# Patient Record
Sex: FEMALE | Race: WHITE | NOT HISPANIC OR LATINO | Employment: OTHER | URBAN - METROPOLITAN AREA
[De-identification: names, ages, dates, MRNs, and addresses within clinical notes are randomized per-mention and may not be internally consistent; named-entity substitution may affect disease eponyms.]

---

## 2021-06-09 ENCOUNTER — HOSPITAL ENCOUNTER (EMERGENCY)
Facility: HOSPITAL | Age: 76
Discharge: HOME | End: 2021-06-09
Attending: EMERGENCY MEDICINE
Payer: MEDICARE

## 2021-06-09 VITALS
HEIGHT: 57 IN | SYSTOLIC BLOOD PRESSURE: 196 MMHG | RESPIRATION RATE: 16 BRPM | DIASTOLIC BLOOD PRESSURE: 87 MMHG | OXYGEN SATURATION: 99 % | WEIGHT: 117.75 LBS | BODY MASS INDEX: 25.4 KG/M2 | TEMPERATURE: 99 F | HEART RATE: 55 BPM

## 2021-06-09 DIAGNOSIS — R63.0 DECREASED APPETITE: Primary | ICD-10-CM

## 2021-06-09 DIAGNOSIS — R53.1 ASTHENIA: ICD-10-CM

## 2021-06-09 LAB
ALBUMIN SERPL BCP-MCNC: 3.7 G/DL (ref 3.5–5.2)
ALP SERPL-CCNC: 121 U/L (ref 55–135)
ALT SERPL W/O P-5'-P-CCNC: 67 U/L (ref 10–44)
ANION GAP SERPL CALC-SCNC: 12 MMOL/L (ref 8–16)
AST SERPL-CCNC: 62 U/L (ref 10–40)
BACTERIA #/AREA URNS HPF: NORMAL /HPF
BASOPHILS # BLD AUTO: 0.1 K/UL (ref 0–0.2)
BASOPHILS NFR BLD: 1.1 % (ref 0–1.9)
BILIRUB SERPL-MCNC: 0.8 MG/DL (ref 0.1–1)
BILIRUB UR QL STRIP: NEGATIVE
BNP SERPL-MCNC: 87 PG/ML (ref 0–99)
BUN SERPL-MCNC: 19 MG/DL (ref 8–23)
CALCIUM SERPL-MCNC: 9 MG/DL (ref 8.7–10.5)
CHLORIDE SERPL-SCNC: 105 MMOL/L (ref 95–110)
CLARITY UR: CLEAR
CO2 SERPL-SCNC: 24 MMOL/L (ref 23–29)
COLOR UR: YELLOW
CREAT SERPL-MCNC: 1.3 MG/DL (ref 0.5–1.4)
DIFFERENTIAL METHOD: ABNORMAL
EOSINOPHIL # BLD AUTO: 0.1 K/UL (ref 0–0.5)
EOSINOPHIL NFR BLD: 1.3 % (ref 0–8)
ERYTHROCYTE [DISTWIDTH] IN BLOOD BY AUTOMATED COUNT: 15.9 % (ref 11.5–14.5)
EST. GFR  (AFRICAN AMERICAN): 46 ML/MIN/1.73 M^2
EST. GFR  (NON AFRICAN AMERICAN): 40 ML/MIN/1.73 M^2
GLUCOSE SERPL-MCNC: 109 MG/DL (ref 70–110)
GLUCOSE UR QL STRIP: NEGATIVE
HCT VFR BLD AUTO: 38.5 % (ref 37–48.5)
HGB BLD-MCNC: 12.2 G/DL (ref 12–16)
HGB UR QL STRIP: NEGATIVE
HYALINE CASTS #/AREA URNS LPF: 0 /LPF
IMM GRANULOCYTES # BLD AUTO: 0.02 K/UL (ref 0–0.04)
IMM GRANULOCYTES NFR BLD AUTO: 0.2 % (ref 0–0.5)
KETONES UR QL STRIP: NEGATIVE
LEUKOCYTE ESTERASE UR QL STRIP: NEGATIVE
LYMPHOCYTES # BLD AUTO: 2.1 K/UL (ref 1–4.8)
LYMPHOCYTES NFR BLD: 23.7 % (ref 18–48)
MCH RBC QN AUTO: 29.9 PG (ref 27–31)
MCHC RBC AUTO-ENTMCNC: 31.7 G/DL (ref 32–36)
MCV RBC AUTO: 94 FL (ref 82–98)
MICROSCOPIC COMMENT: NORMAL
MONOCYTES # BLD AUTO: 0.9 K/UL (ref 0.3–1)
MONOCYTES NFR BLD: 10.4 % (ref 4–15)
NEUTROPHILS # BLD AUTO: 5.7 K/UL (ref 1.8–7.7)
NEUTROPHILS NFR BLD: 63.3 % (ref 38–73)
NITRITE UR QL STRIP: NEGATIVE
NRBC BLD-RTO: 0 /100 WBC
PH UR STRIP: 7 [PH] (ref 5–8)
PLATELET # BLD AUTO: 283 K/UL (ref 150–450)
PMV BLD AUTO: 10.4 FL (ref 9.2–12.9)
POTASSIUM SERPL-SCNC: 3.5 MMOL/L (ref 3.5–5.1)
PROT SERPL-MCNC: 6.8 G/DL (ref 6–8.4)
PROT UR QL STRIP: ABNORMAL
RBC # BLD AUTO: 4.08 M/UL (ref 4–5.4)
RBC #/AREA URNS HPF: 0 /HPF (ref 0–4)
SODIUM SERPL-SCNC: 141 MMOL/L (ref 136–145)
SP GR UR STRIP: 1.02 (ref 1–1.03)
TROPONIN I SERPL DL<=0.01 NG/ML-MCNC: 0.02 NG/ML (ref 0–0.03)
URN SPEC COLLECT METH UR: ABNORMAL
UROBILINOGEN UR STRIP-ACNC: NEGATIVE EU/DL
WBC # BLD AUTO: 8.95 K/UL (ref 3.9–12.7)
WBC #/AREA URNS HPF: 1 /HPF (ref 0–5)

## 2021-06-09 PROCEDURE — 93010 ELECTROCARDIOGRAM REPORT: CPT | Mod: ,,, | Performed by: INTERNAL MEDICINE

## 2021-06-09 PROCEDURE — 80053 COMPREHEN METABOLIC PANEL: CPT | Performed by: EMERGENCY MEDICINE

## 2021-06-09 PROCEDURE — 84484 ASSAY OF TROPONIN QUANT: CPT | Performed by: EMERGENCY MEDICINE

## 2021-06-09 PROCEDURE — 83880 ASSAY OF NATRIURETIC PEPTIDE: CPT | Performed by: EMERGENCY MEDICINE

## 2021-06-09 PROCEDURE — 96360 HYDRATION IV INFUSION INIT: CPT

## 2021-06-09 PROCEDURE — 93005 ELECTROCARDIOGRAM TRACING: CPT

## 2021-06-09 PROCEDURE — 25000003 PHARM REV CODE 250: Performed by: EMERGENCY MEDICINE

## 2021-06-09 PROCEDURE — 85025 COMPLETE CBC W/AUTO DIFF WBC: CPT | Performed by: EMERGENCY MEDICINE

## 2021-06-09 PROCEDURE — 99285 EMERGENCY DEPT VISIT HI MDM: CPT | Mod: 25

## 2021-06-09 PROCEDURE — 81000 URINALYSIS NONAUTO W/SCOPE: CPT | Performed by: EMERGENCY MEDICINE

## 2021-06-09 PROCEDURE — 96361 HYDRATE IV INFUSION ADD-ON: CPT

## 2021-06-09 PROCEDURE — 93010 EKG 12-LEAD: ICD-10-PCS | Mod: ,,, | Performed by: INTERNAL MEDICINE

## 2021-06-09 RX ORDER — OMEPRAZOLE 40 MG/1
40 CAPSULE, DELAYED RELEASE ORAL DAILY
COMMUNITY
Start: 2021-05-23

## 2021-06-09 RX ORDER — ACETAMINOPHEN 500 MG
1000 TABLET ORAL
Status: COMPLETED | OUTPATIENT
Start: 2021-06-09 | End: 2021-06-09

## 2021-06-09 RX ORDER — TOPIRAMATE 25 MG/1
25 TABLET ORAL DAILY
COMMUNITY
Start: 2021-05-04

## 2021-06-09 RX ORDER — ONDANSETRON HYDROCHLORIDE 8 MG/1
TABLET, FILM COATED ORAL
COMMUNITY
Start: 2021-06-09

## 2021-06-09 RX ORDER — IBUPROFEN 600 MG/1
600 TABLET ORAL 3 TIMES DAILY
COMMUNITY
Start: 2021-05-05

## 2021-06-09 RX ORDER — NALOXEGOL OXALATE 25 MG/1
25 TABLET, FILM COATED ORAL EVERY MORNING
COMMUNITY
Start: 2021-02-23

## 2021-06-09 RX ORDER — CLOPIDOGREL BISULFATE 75 MG/1
75 TABLET ORAL DAILY
COMMUNITY
Start: 2021-03-22

## 2021-06-09 RX ORDER — FUROSEMIDE 20 MG/1
20 TABLET ORAL DAILY
COMMUNITY
Start: 2021-03-02

## 2021-06-09 RX ORDER — AMLODIPINE BESYLATE 10 MG/1
5 TABLET ORAL DAILY
COMMUNITY
Start: 2020-12-14

## 2021-06-09 RX ORDER — ROSUVASTATIN CALCIUM 40 MG/1
TABLET, COATED ORAL
COMMUNITY
Start: 2021-03-18

## 2021-06-09 RX ORDER — SPIRONOLACTONE 50 MG/1
50 TABLET, FILM COATED ORAL DAILY
COMMUNITY
Start: 2021-02-08

## 2021-06-09 RX ORDER — BUSPIRONE HYDROCHLORIDE 10 MG/1
10 TABLET ORAL 2 TIMES DAILY
COMMUNITY
Start: 2021-02-26

## 2021-06-09 RX ORDER — TRAZODONE HYDROCHLORIDE 50 MG/1
50 TABLET ORAL NIGHTLY PRN
COMMUNITY
Start: 2021-01-02

## 2021-06-09 RX ORDER — METOPROLOL SUCCINATE 25 MG/1
25 TABLET, EXTENDED RELEASE ORAL 2 TIMES DAILY
COMMUNITY
Start: 2021-02-26

## 2021-06-09 RX ORDER — AMLODIPINE BESYLATE 5 MG/1
10 TABLET ORAL
Status: COMPLETED | OUTPATIENT
Start: 2021-06-09 | End: 2021-06-09

## 2021-06-09 RX ORDER — LACTULOSE 10 G/15ML
SOLUTION ORAL; RECTAL
COMMUNITY
Start: 2021-06-07

## 2021-06-09 RX ORDER — GABAPENTIN 300 MG/1
300 CAPSULE ORAL 3 TIMES DAILY
COMMUNITY
Start: 2021-04-30

## 2021-06-09 RX ORDER — SODIUM CHLORIDE 9 MG/ML
INJECTION, SOLUTION INTRAVENOUS
Status: COMPLETED | OUTPATIENT
Start: 2021-06-09 | End: 2021-06-09

## 2021-06-09 RX ORDER — ISOSORBIDE MONONITRATE 30 MG/1
TABLET, EXTENDED RELEASE ORAL
COMMUNITY
Start: 2021-05-04

## 2021-06-09 RX ORDER — TIZANIDINE 4 MG/1
4 TABLET ORAL 2 TIMES DAILY PRN
COMMUNITY
Start: 2021-05-05

## 2021-06-09 RX ORDER — DULOXETIN HYDROCHLORIDE 20 MG/1
20 CAPSULE, DELAYED RELEASE ORAL DAILY
COMMUNITY
Start: 2021-06-04

## 2021-06-09 RX ORDER — OXYCODONE HYDROCHLORIDE 15 MG/1
15 TABLET ORAL 2 TIMES DAILY
COMMUNITY
Start: 2021-05-05

## 2021-06-09 RX ORDER — HYDROXYZINE HYDROCHLORIDE 10 MG/1
10 TABLET, FILM COATED ORAL 2 TIMES DAILY PRN
COMMUNITY
Start: 2021-02-26

## 2021-06-09 RX ORDER — MORPHINE SULFATE 15 MG/1
TABLET ORAL
COMMUNITY

## 2021-06-09 RX ADMIN — ACETAMINOPHEN 1000 MG: 500 TABLET ORAL at 04:06

## 2021-06-09 RX ADMIN — SODIUM CHLORIDE: 0.9 INJECTION, SOLUTION INTRAVENOUS at 03:06

## 2021-06-09 RX ADMIN — AMLODIPINE BESYLATE 10 MG: 5 TABLET ORAL at 06:06

## 2022-09-09 ENCOUNTER — HOSPITAL ENCOUNTER (OUTPATIENT)
Facility: HOSPITAL | Age: 77
Discharge: HOME HEALTH | End: 2022-09-10
Attending: EMERGENCY MEDICINE | Admitting: STUDENT IN AN ORGANIZED HEALTH CARE EDUCATION/TRAINING PROGRAM
Payer: MEDICARE

## 2022-09-09 DIAGNOSIS — S32.009A CLOSED FRACTURE OF TRANSVERSE PROCESS OF LUMBAR VERTEBRA, INITIAL ENCOUNTER: Primary | ICD-10-CM

## 2022-09-09 DIAGNOSIS — G25.3 MYOCLONIC JERKING: ICD-10-CM

## 2022-09-09 DIAGNOSIS — M25.551 RIGHT HIP PAIN: ICD-10-CM
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
## 2022-09-09 DIAGNOSIS — N17.9 AKI (ACUTE KIDNEY INJURY): ICD-10-CM

## 2022-09-09 DIAGNOSIS — S01.512A LACERATION OF TONGUE, INITIAL ENCOUNTER: ICD-10-CM

## 2022-09-09 DIAGNOSIS — M54.50 ACUTE MIDLINE LOW BACK PAIN WITHOUT SCIATICA: ICD-10-CM

## 2022-09-09 PROBLEM — I10 HTN (HYPERTENSION): Status: ACTIVE | Noted: 2022-09-09

## 2022-09-09 PROBLEM — G93.40 ENCEPHALOPATHY ACUTE: Status: ACTIVE | Noted: 2022-09-09

## 2022-09-09 PROBLEM — F41.1 GAD (GENERALIZED ANXIETY DISORDER): Status: ACTIVE | Noted: 2022-09-09

## 2022-09-09 PROBLEM — Z86.73 HISTORY OF STROKE: Status: ACTIVE | Noted: 2022-09-09

## 2022-09-09 PROBLEM — G93.41 ACUTE METABOLIC ENCEPHALOPATHY: Status: ACTIVE | Noted: 2022-09-09

## 2022-09-09 LAB
ALBUMIN SERPL BCP-MCNC: 3.4 G/DL (ref 3.5–5.2)
ALP SERPL-CCNC: 168 U/L (ref 55–135)
ALT SERPL W/O P-5'-P-CCNC: 22 U/L (ref 10–44)
AMPHET+METHAMPHET UR QL: NEGATIVE
ANION GAP SERPL CALC-SCNC: 9 MMOL/L (ref 8–16)
AST SERPL-CCNC: 28 U/L (ref 10–40)
BACTERIA #/AREA URNS HPF: ABNORMAL /HPF
BARBITURATES UR QL SCN>200 NG/ML: NEGATIVE
BASOPHILS # BLD AUTO: 0.06 K/UL (ref 0–0.2)
BASOPHILS NFR BLD: 0.7 % (ref 0–1.9)
BENZODIAZ UR QL SCN>200 NG/ML: NEGATIVE
BILIRUB SERPL-MCNC: 0.6 MG/DL (ref 0.1–1)
BILIRUB UR QL STRIP: NEGATIVE
BNP SERPL-MCNC: 265 PG/ML (ref 0–99)
BUN SERPL-MCNC: 22 MG/DL (ref 8–23)
BZE UR QL SCN: NEGATIVE
CALCIUM SERPL-MCNC: 8.9 MG/DL (ref 8.7–10.5)
CANNABINOIDS UR QL SCN: NEGATIVE
CHLORIDE SERPL-SCNC: 106 MMOL/L (ref 95–110)
CLARITY UR: ABNORMAL
CO2 SERPL-SCNC: 21 MMOL/L (ref 23–29)
COLOR UR: YELLOW
CREAT SERPL-MCNC: 1.9 MG/DL (ref 0.5–1.4)
CREAT UR-MCNC: 63.6 MG/DL (ref 15–325)
DIFFERENTIAL METHOD: ABNORMAL
EOSINOPHIL # BLD AUTO: 0.3 K/UL (ref 0–0.5)
EOSINOPHIL NFR BLD: 3.5 % (ref 0–8)
ERYTHROCYTE [DISTWIDTH] IN BLOOD BY AUTOMATED COUNT: 18.1 % (ref 11.5–14.5)
EST. GFR  (NO RACE VARIABLE): 27 ML/MIN/1.73 M^2
GLUCOSE SERPL-MCNC: 137 MG/DL (ref 70–110)
GLUCOSE UR QL STRIP: NEGATIVE
HCT VFR BLD AUTO: 32.1 % (ref 37–48.5)
HGB BLD-MCNC: 10.5 G/DL (ref 12–16)
HGB UR QL STRIP: NEGATIVE
HYALINE CASTS #/AREA URNS LPF: 0 /LPF
IMM GRANULOCYTES # BLD AUTO: 0.03 K/UL (ref 0–0.04)
IMM GRANULOCYTES NFR BLD AUTO: 0.4 % (ref 0–0.5)
INR PPP: 1 (ref 0.8–1.2)
KETONES UR QL STRIP: NEGATIVE
LEUKOCYTE ESTERASE UR QL STRIP: ABNORMAL
LYMPHOCYTES # BLD AUTO: 2.6 K/UL (ref 1–4.8)
LYMPHOCYTES NFR BLD: 32.2 % (ref 18–48)
MCH RBC QN AUTO: 30.7 PG (ref 27–31)
MCHC RBC AUTO-ENTMCNC: 32.7 G/DL (ref 32–36)
MCV RBC AUTO: 94 FL (ref 82–98)
METHADONE UR QL SCN>300 NG/ML: NEGATIVE
MICROSCOPIC COMMENT: ABNORMAL
MONOCYTES # BLD AUTO: 0.9 K/UL (ref 0.3–1)
MONOCYTES NFR BLD: 11.1 % (ref 4–15)
NEUTROPHILS # BLD AUTO: 4.3 K/UL (ref 1.8–7.7)
NEUTROPHILS NFR BLD: 52.1 % (ref 38–73)
NITRITE UR QL STRIP: NEGATIVE
NRBC BLD-RTO: 0 /100 WBC
OPIATES UR QL SCN: ABNORMAL
PCP UR QL SCN>25 NG/ML: NEGATIVE
PH UR STRIP: 6 [PH] (ref 5–8)
PLATELET # BLD AUTO: 333 K/UL (ref 150–450)
PMV BLD AUTO: 10.4 FL (ref 9.2–12.9)
POCT GLUCOSE: 150 MG/DL (ref 70–110)
POTASSIUM SERPL-SCNC: 4.3 MMOL/L (ref 3.5–5.1)
PROT SERPL-MCNC: 7.2 G/DL (ref 6–8.4)
PROT UR QL STRIP: ABNORMAL
PROTHROMBIN TIME: 11 SEC (ref 9–12.5)
RBC # BLD AUTO: 3.42 M/UL (ref 4–5.4)
RBC #/AREA URNS HPF: 0 /HPF (ref 0–4)
SARS-COV-2 RDRP RESP QL NAA+PROBE: NEGATIVE
SODIUM SERPL-SCNC: 136 MMOL/L (ref 136–145)
SP GR UR STRIP: 1.01 (ref 1–1.03)
SQUAMOUS #/AREA URNS HPF: 2 /HPF
TOXICOLOGY INFORMATION: ABNORMAL
TROPONIN I SERPL DL<=0.01 NG/ML-MCNC: 0.03 NG/ML (ref 0–0.03)
TSH SERPL DL<=0.005 MIU/L-ACNC: 2.68 UIU/ML (ref 0.4–4)
URN SPEC COLLECT METH UR: ABNORMAL
UROBILINOGEN UR STRIP-ACNC: NEGATIVE EU/DL
WBC # BLD AUTO: 8.2 K/UL (ref 3.9–12.7)
WBC #/AREA URNS HPF: 3 /HPF (ref 0–5)

## 2022-09-09 PROCEDURE — 84443 ASSAY THYROID STIM HORMONE: CPT | Performed by: EMERGENCY MEDICINE

## 2022-09-09 PROCEDURE — 96375 TX/PRO/DX INJ NEW DRUG ADDON: CPT

## 2022-09-09 PROCEDURE — G0378 HOSPITAL OBSERVATION PER HR: HCPCS

## 2022-09-09 PROCEDURE — U0002 COVID-19 LAB TEST NON-CDC: HCPCS | Performed by: EMERGENCY MEDICINE

## 2022-09-09 PROCEDURE — 85610 PROTHROMBIN TIME: CPT | Performed by: EMERGENCY MEDICINE

## 2022-09-09 PROCEDURE — 93010 ELECTROCARDIOGRAM REPORT: CPT | Mod: ,,, | Performed by: INTERNAL MEDICINE

## 2022-09-09 PROCEDURE — 84484 ASSAY OF TROPONIN QUANT: CPT | Performed by: EMERGENCY MEDICINE

## 2022-09-09 PROCEDURE — 82962 GLUCOSE BLOOD TEST: CPT

## 2022-09-09 PROCEDURE — G0425 PR INPT TELEHEALTH CONSULT 30M: ICD-10-PCS | Mod: 95,,, | Performed by: PSYCHIATRY & NEUROLOGY

## 2022-09-09 PROCEDURE — 93005 ELECTROCARDIOGRAM TRACING: CPT

## 2022-09-09 PROCEDURE — 96361 HYDRATE IV INFUSION ADD-ON: CPT

## 2022-09-09 PROCEDURE — 81000 URINALYSIS NONAUTO W/SCOPE: CPT | Mod: 59 | Performed by: EMERGENCY MEDICINE

## 2022-09-09 PROCEDURE — 63600175 PHARM REV CODE 636 W HCPCS: Performed by: EMERGENCY MEDICINE

## 2022-09-09 PROCEDURE — 63600175 PHARM REV CODE 636 W HCPCS: Performed by: INTERNAL MEDICINE

## 2022-09-09 PROCEDURE — G0425 INPT/ED TELECONSULT30: HCPCS | Mod: 95,,, | Performed by: PSYCHIATRY & NEUROLOGY

## 2022-09-09 PROCEDURE — 99291 CRITICAL CARE FIRST HOUR: CPT | Mod: 25,CS

## 2022-09-09 PROCEDURE — 25000003 PHARM REV CODE 250: Performed by: EMERGENCY MEDICINE

## 2022-09-09 PROCEDURE — 83880 ASSAY OF NATRIURETIC PEPTIDE: CPT | Performed by: EMERGENCY MEDICINE

## 2022-09-09 PROCEDURE — 80061 LIPID PANEL: CPT | Performed by: EMERGENCY MEDICINE

## 2022-09-09 PROCEDURE — 85025 COMPLETE CBC W/AUTO DIFF WBC: CPT | Performed by: EMERGENCY MEDICINE

## 2022-09-09 PROCEDURE — 80307 DRUG TEST PRSMV CHEM ANLYZR: CPT | Performed by: EMERGENCY MEDICINE

## 2022-09-09 PROCEDURE — 93010 EKG 12-LEAD: ICD-10-PCS | Mod: ,,, | Performed by: INTERNAL MEDICINE

## 2022-09-09 PROCEDURE — 80053 COMPREHEN METABOLIC PANEL: CPT | Performed by: EMERGENCY MEDICINE

## 2022-09-09 RX ORDER — GUAIFENESIN 100 MG/5ML
200 SOLUTION ORAL EVERY 4 HOURS PRN
Status: DISCONTINUED | OUTPATIENT
Start: 2022-09-10 | End: 2022-09-10 | Stop reason: HOSPADM

## 2022-09-09 RX ORDER — NITROGLYCERIN 0.4 MG/1
0.4 TABLET SUBLINGUAL EVERY 5 MIN PRN
COMMUNITY

## 2022-09-09 RX ORDER — OLMESARTAN MEDOXOMIL 40 MG/1
40 TABLET ORAL DAILY
COMMUNITY
Start: 2022-07-14

## 2022-09-09 RX ORDER — ENOXAPARIN SODIUM 100 MG/ML
30 INJECTION SUBCUTANEOUS EVERY 24 HOURS
Status: DISCONTINUED | OUTPATIENT
Start: 2022-09-10 | End: 2022-09-10 | Stop reason: HOSPADM

## 2022-09-09 RX ORDER — SODIUM CHLORIDE 9 MG/ML
INJECTION, SOLUTION INTRAVENOUS CONTINUOUS
Status: DISCONTINUED | OUTPATIENT
Start: 2022-09-10 | End: 2022-09-10 | Stop reason: HOSPADM

## 2022-09-09 RX ORDER — OLMESARTAN MEDOXOMIL 20 MG/1
20 TABLET ORAL
Status: ON HOLD | COMMUNITY
End: 2022-09-10 | Stop reason: HOSPADM

## 2022-09-09 RX ORDER — MAG HYDROX/ALUMINUM HYD/SIMETH 200-200-20
30 SUSPENSION, ORAL (FINAL DOSE FORM) ORAL EVERY 6 HOURS PRN
Status: DISCONTINUED | OUTPATIENT
Start: 2022-09-10 | End: 2022-09-10 | Stop reason: HOSPADM

## 2022-09-09 RX ORDER — IPRATROPIUM BROMIDE AND ALBUTEROL SULFATE 2.5; .5 MG/3ML; MG/3ML
3 SOLUTION RESPIRATORY (INHALATION) EVERY 4 HOURS PRN
Status: DISCONTINUED | OUTPATIENT
Start: 2022-09-10 | End: 2022-09-10 | Stop reason: HOSPADM

## 2022-09-09 RX ORDER — ONDANSETRON 2 MG/ML
4 INJECTION INTRAMUSCULAR; INTRAVENOUS EVERY 8 HOURS PRN
Status: DISCONTINUED | OUTPATIENT
Start: 2022-09-10 | End: 2022-09-10 | Stop reason: HOSPADM

## 2022-09-09 RX ORDER — HYDRALAZINE HYDROCHLORIDE 20 MG/ML
10 INJECTION INTRAMUSCULAR; INTRAVENOUS EVERY 6 HOURS PRN
Status: DISCONTINUED | OUTPATIENT
Start: 2022-09-09 | End: 2022-09-10 | Stop reason: HOSPADM

## 2022-09-09 RX ORDER — ACETAMINOPHEN 325 MG/1
650 TABLET ORAL EVERY 6 HOURS PRN
Status: DISCONTINUED | OUTPATIENT
Start: 2022-09-10 | End: 2022-09-10 | Stop reason: HOSPADM

## 2022-09-09 RX ORDER — LORAZEPAM 2 MG/ML
0.5 INJECTION INTRAMUSCULAR
Status: COMPLETED | OUTPATIENT
Start: 2022-09-09 | End: 2022-09-09

## 2022-09-09 RX ADMIN — HYDRALAZINE HYDROCHLORIDE 10 MG: 20 INJECTION INTRAMUSCULAR; INTRAVENOUS at 10:09

## 2022-09-09 RX ADMIN — LORAZEPAM 0.5 MG: 2 INJECTION INTRAMUSCULAR; INTRAVENOUS at 04:09

## 2022-09-09 RX ADMIN — SODIUM CHLORIDE, SODIUM LACTATE, POTASSIUM CHLORIDE, AND CALCIUM CHLORIDE 500 ML: .6; .31; .03; .02 INJECTION, SOLUTION INTRAVENOUS at 05:09

## 2022-09-09 RX ADMIN — NITROGLYCERIN 1 INCH: 20 OINTMENT TOPICAL at 07:09

## 2022-09-09 NOTE — ED PROVIDER NOTES
SCRIBE #1 NOTE: I, Duran Calvo, am scribing for, and in the presence of, Andres Solomon MD. I have scribed the entire note.       History     Chief Complaint   Patient presents with    Cerebrovascular Accident     LKW of 0830 am pt unable to speak, found by neighbor, takes plavix, pmhx of stroke     Review of patient's allergies indicates:   Allergen Reactions    Stadol [butorphanol tartrate] Hives and Hallucinations    Sulfa (sulfonamide antibiotics) Rash         History of Present Illness     HPI    Limited HPI and ROS due to acuity of condition    9/9/2022, 4:45 PM  History obtained from the patient and EMS      History of Present Illness: Lilia Tong is a 77 y.o. female patient with a PMHx of stroke who presents to the Emergency Department for evaluation of AMS which onset PTA to the ED. The pt was last talked to at 8:30 am by son; she was fine at the time. The pt's [employer] found her on  the floor shaking and not talking right and called EMS just PTA. The pt has been falling alot recently. The pt has bruising to face and arms from previous falls. The pt takes Plavix. No further complaints or concerns at this time.       Arrival mode: EMS    PCP: Santino Casas MD        Past Medical History:  Past Medical History:   Diagnosis Date    Hypertension     Stroke        Past Surgical History:  History reviewed. No pertinent surgical history.      Family History:  Family History   Problem Relation Age of Onset    Hypertension Father        Social History:  Social History     Tobacco Use    Smoking status: Never    Smokeless tobacco: Never   Substance and Sexual Activity    Alcohol use: Not on file    Drug use: Not on file    Sexual activity: Not on file        Review of Systems     Review of Systems   Unable to perform ROS: Acuity of condition      Physical Exam     Initial Vitals [09/09/22 1636]   BP Pulse Resp Temp SpO2   (!) 170/100 64 18 98.9 °F (37.2 °C) 98 %      MAP       --          Physical  Exam  Nursing Notes and Vital Signs Reviewed.  Constitutional: Patient is in mild distress. Well-developed and well-nourished.  Head: Atraumatic. Normocephalic. Dry blood around mouth w/ right sided tongue laceration, venous oozing; old brusing in periorbital region with no facial deformities nor nose bleed.  Eyes: PERRL. EOM intact. Conjunctivae are not pale. No scleral icterus.  ENT: Mucous membranes are moist. Oropharynx is clear and symmetric.   Neck: Supple. Full ROM. No lymphadenopathy.  Cardiovascular: Regular rate. Regular rhythm. No murmurs, rubs, or gallops. Distal pulses are 2+ and symmetric.  Pulmonary/Chest: No respiratory distress. Clear to auscultation bilaterally. No wheezing or rales. Equal bilateral breathe sounds.  Abdominal: Soft and non-distended.  There is no tenderness.  No rebound, guarding, or rigidity. Spinal stimulator magnet by left rib cage area. Good bowel sounds.  Genitourinary: No CVA tenderness  Musculoskeletal: No obv deformities; able to move arms though with jerky motion intermittently. No edema. No calf tenderness. Upper lumbar tenderness upon palpitation in midline. Right Hip tenderness. Full ROM in LE.  Skin: Warm and dry.  Old bruising in BUE.  Neurological: Unable to answer questions but awake and oriented. Normal speech.  No acute focal neurological deficits are appreciated. Unable to participate in the NIH scale.  Psychiatric: Normal affect. Good eye contact. Appropriate in content.     ED Course   Critical Care    Date/Time: 9/9/2022 9:16 PM  Performed by: Adnres Solomon MD  Authorized by: Andres Solomon MD   Direct patient critical care time: 6 minutes  Additional history critical care time: 7 minutes  Ordering / reviewing critical care time: 5 minutes  Documentation critical care time: 4 minutes  Consulting other physicians critical care time: 3 minutes  Consult with family critical care time: 4 minutes  Other critical care time: 5 minutes  Total critical care time  "(exclusive of procedural time) : 34 minutes  Critical care time was exclusive of separately billable procedures and treating other patients and teaching time.  Critical care was necessary to treat or prevent imminent or life-threatening deterioration of the following conditions: DEMARCO.  Critical care was time spent personally by me on the following activities: blood draw for specimens, development of treatment plan with patient or surrogate, discussions with consultants, interpretation of cardiac output measurements, evaluation of patient's response to treatment, examination of patient, obtaining history from patient or surrogate, ordering and performing treatments and interventions, ordering and review of radiographic studies, pulse oximetry, ordering and review of laboratory studies, re-evaluation of patient's condition and review of old charts.      ED Vital Signs:  Vitals:    09/09/22 1636 09/09/22 1747 09/09/22 1748 09/09/22 1902   BP: (!) 170/100 (!) 163/74  (!) 195/88   Pulse: 64 (!) 57     Resp: 18 19     Temp: 98.9 °F (37.2 °C)      TempSrc: Axillary      SpO2: 98%  95%    Weight: 53.9 kg (118 lb 13.3 oz)      Height: 4' 9" (1.448 m)       09/09/22 1936   BP: (!) 188/74   Pulse: (!) 53   Resp:    Temp:    TempSrc:    SpO2: 98%   Weight:    Height:        Abnormal Lab Results:  Labs Reviewed   CBC W/ AUTO DIFFERENTIAL - Abnormal; Notable for the following components:       Result Value    RBC 3.42 (*)     Hemoglobin 10.5 (*)     Hematocrit 32.1 (*)     RDW 18.1 (*)     All other components within normal limits   COMPREHENSIVE METABOLIC PANEL - Abnormal; Notable for the following components:    CO2 21 (*)     Glucose 137 (*)     Creatinine 1.9 (*)     Albumin 3.4 (*)     Alkaline Phosphatase 168 (*)     eGFR 27 (*)     All other components within normal limits   TROPONIN I - Abnormal; Notable for the following components:    Troponin I 0.028 (*)     All other components within normal limits   B-TYPE NATRIURETIC " PEPTIDE - Abnormal; Notable for the following components:     (*)     All other components within normal limits   URINALYSIS, REFLEX TO URINE CULTURE - Abnormal; Notable for the following components:    Appearance, UA Hazy (*)     Protein, UA 1+ (*)     Leukocytes, UA Trace (*)     All other components within normal limits    Narrative:     Specimen Source->Urine   DRUG SCREEN PANEL, URINE EMERGENCY - Abnormal; Notable for the following components:    Opiate Scrn, Ur Presumptive Positive (*)     All other components within normal limits    Narrative:     Specimen Source->Urine   URINALYSIS MICROSCOPIC - Abnormal; Notable for the following components:    Bacteria Moderate (*)     All other components within normal limits    Narrative:     Specimen Source->Urine   POCT GLUCOSE - Abnormal; Notable for the following components:    POCT Glucose 150 (*)     All other components within normal limits   PROTIME-INR   TSH   TROPONIN I   SARS-COV-2 RNA AMPLIFICATION, QUAL   LIPID PANEL   POCT GLUCOSE, HAND-HELD DEVICE        All Lab Results:  Results for orders placed or performed during the hospital encounter of 09/09/22   CBC W/ AUTO DIFFERENTIAL   Result Value Ref Range    WBC 8.20 3.90 - 12.70 K/uL    RBC 3.42 (L) 4.00 - 5.40 M/uL    Hemoglobin 10.5 (L) 12.0 - 16.0 g/dL    Hematocrit 32.1 (L) 37.0 - 48.5 %    MCV 94 82 - 98 fL    MCH 30.7 27.0 - 31.0 pg    MCHC 32.7 32.0 - 36.0 g/dL    RDW 18.1 (H) 11.5 - 14.5 %    Platelets 333 150 - 450 K/uL    MPV 10.4 9.2 - 12.9 fL    Immature Granulocytes 0.4 0.0 - 0.5 %    Gran # (ANC) 4.3 1.8 - 7.7 K/uL    Immature Grans (Abs) 0.03 0.00 - 0.04 K/uL    Lymph # 2.6 1.0 - 4.8 K/uL    Mono # 0.9 0.3 - 1.0 K/uL    Eos # 0.3 0.0 - 0.5 K/uL    Baso # 0.06 0.00 - 0.20 K/uL    nRBC 0 0 /100 WBC    Gran % 52.1 38.0 - 73.0 %    Lymph % 32.2 18.0 - 48.0 %    Mono % 11.1 4.0 - 15.0 %    Eosinophil % 3.5 0.0 - 8.0 %    Basophil % 0.7 0.0 - 1.9 %    Differential Method Automated     Comprehensive metabolic panel   Result Value Ref Range    Sodium 136 136 - 145 mmol/L    Potassium 4.3 3.5 - 5.1 mmol/L    Chloride 106 95 - 110 mmol/L    CO2 21 (L) 23 - 29 mmol/L    Glucose 137 (H) 70 - 110 mg/dL    BUN 22 8 - 23 mg/dL    Creatinine 1.9 (H) 0.5 - 1.4 mg/dL    Calcium 8.9 8.7 - 10.5 mg/dL    Total Protein 7.2 6.0 - 8.4 g/dL    Albumin 3.4 (L) 3.5 - 5.2 g/dL    Total Bilirubin 0.6 0.1 - 1.0 mg/dL    Alkaline Phosphatase 168 (H) 55 - 135 U/L    AST 28 10 - 40 U/L    ALT 22 10 - 44 U/L    Anion Gap 9 8 - 16 mmol/L    eGFR 27 (A) >60 mL/min/1.73 m^2   Protime-INR   Result Value Ref Range    Prothrombin Time 11.0 9.0 - 12.5 sec    INR 1.0 0.8 - 1.2   TSH   Result Value Ref Range    TSH 2.682 0.400 - 4.000 uIU/mL   Troponin I   Result Value Ref Range    Troponin I 0.028 (H) 0.000 - 0.026 ng/mL   B-Type natriuretic peptide   Result Value Ref Range     (H) 0 - 99 pg/mL   Urinalysis, Reflex to Urine Culture Urine, Clean Catch    Specimen: Urine   Result Value Ref Range    Specimen UA Urine, Clean Catch     Color, UA Yellow Yellow, Straw, Delores    Appearance, UA Hazy (A) Clear    pH, UA 6.0 5.0 - 8.0    Specific Gravity, UA 1.010 1.005 - 1.030    Protein, UA 1+ (A) Negative    Glucose, UA Negative Negative    Ketones, UA Negative Negative    Bilirubin (UA) Negative Negative    Occult Blood UA Negative Negative    Nitrite, UA Negative Negative    Urobilinogen, UA Negative <2.0 EU/dL    Leukocytes, UA Trace (A) Negative   Drug screen panel, emergency   Result Value Ref Range    Benzodiazepines Negative Negative    Methadone metabolites Negative Negative    Cocaine (Metab.) Negative Negative    Opiate Scrn, Ur Presumptive Positive (A) Negative    Barbiturate Screen, Ur Negative Negative    Amphetamine Screen, Ur Negative Negative    THC Negative Negative    Phencyclidine Negative Negative    Creatinine, Urine 63.6 15.0 - 325.0 mg/dL    Toxicology Information SEE COMMENT    COVID-19 Rapid Screening    Result Value Ref Range    SARS-CoV-2 RNA, Amplification, Qual Negative Negative   Urinalysis Microscopic   Result Value Ref Range    RBC, UA 0 0 - 4 /hpf    WBC, UA 3 0 - 5 /hpf    Bacteria Moderate (A) None-Occ /hpf    Squam Epithel, UA 2 /hpf    Hyaline Casts, UA 0 0-1/lpf /lpf    Microscopic Comment SEE COMMENT    POCT glucose   Result Value Ref Range    POCT Glucose 150 (H) 70 - 110 mg/dL        Imaging Results:  Imaging Results              CT Lumbar Spine Without Contrast (Final result)  Result time 09/09/22 18:58:30      Final result by Demar Roberts MD (09/09/22 18:58:30)                   Impression:      Left L1 transverse process fracture.    Remote appearing vertebral body compression fractures.  No acute vertebral body compression fracture.    Degenerative findings and additional findings as above.    All CT scans at this facility are performed  using dose modulation techniques as appropriate to performed exam including the following:  automated exposure control; adjustment of mA and/or kV according to the patients size (this includes techniques or standardized protocols for targeted exams where dose is matched to indication/reason for exam: i.e. extremities or head);  iterative reconstruction technique.      Electronically signed by: Demar Roberts  Date:    09/09/2022  Time:    18:58               Narrative:    EXAMINATION:  CT LUMBAR SPINE WITHOUT CONTRAST    CLINICAL HISTORY:  Low back pain, trauma;    TECHNIQUE:  Low-dose CT images obtained throughout the region of the lumbar spine.  Axial, sagittal and coronal reformations were performed.  Contrast was not administered.    COMPARISON:  None.    FINDINGS:  Remote appearing L4 and L2 vertebral body compression fractures.  Likely remote T11 vertebral body compression fracture plan vertebral augmentation change at T11 and L4.  No definite acute vertebral body compression fracture identified.  No priors available for direct  comparison.    Extensive osseous posterior fusion    Acute minimally displaced fracture of the left L1 transverse process.    Bilateral sacroiliac joint sclerosis.    Normal sagittal alignment is preserved.  No spondylolisthesis.    Multifocal osseous degenerative change without definite severe spinal canal stenosis.  Mild-to-moderate scattered neural foraminal narrowing.    Limited evaluation of the intraspinal contents demonstrates no hematoma or mass.    Paraspinal soft tissues exhibit no acute abnormalities.                                       X-Ray Hip 2 or 3 views Right (with Pelvis when performed) (Final result)  Result time 09/09/22 19:00:20      Final result by Demar Roberts MD (09/09/22 19:00:20)                   Impression:      Degenerative changes without definite acute displaced fracture.      Electronically signed by: Demar Roberts  Date:    09/09/2022  Time:    19:00               Narrative:    EXAMINATION:  XR HIP WITH PELVIS WHEN PERFORMED, 2 OR 3  VIEWS RIGHT    CLINICAL HISTORY:  Pain in right hip    TECHNIQUE:  AP view of the pelvis and frog leg lateral view of the right hip were performed.    COMPARISON:  None    FINDINGS:  No acute displaced right hip fracture identified.    Moderate degenerative change of the hips bilaterally.  No definite acute displaced symphysis pubis fracture or pubic ramus fracture.  Degenerative changes sacroiliac joints and spine.                                       X-Ray Chest AP Portable (Final result)  Result time 09/09/22 17:51:48      Final result by Demar Roberts MD (09/09/22 17:51:48)                   Impression:      No acute abnormality.      Electronically signed by: Demar Roberts  Date:    09/09/2022  Time:    17:51               Narrative:    EXAMINATION:  XR CHEST AP PORTABLE    CLINICAL HISTORY:  Stroke;    TECHNIQUE:  Single frontal view of the chest was performed.    COMPARISON:  06/09/2021    FINDINGS:  The lungs are clear, with normal  appearance of pulmonary vasculature and no pleural effusion or pneumothorax.    The cardiac silhouette is enlarged.  The hilar and mediastinal contours are unremarkable.    Bones are intact.  Spine stimulator in place.                                       CT Maxillofacial Without Contrast (Final result)  Result time 09/09/22 17:16:07      Final result by Demar Roberts MD (09/09/22 17:16:07)                   Impression:      No evidence of an acute displaced fracture.    Additional findings as above.    All CT scans at this facility are performed  using dose modulation techniques as appropriate to performed exam including the following:  automated exposure control; adjustment of mA and/or kV according to the patients size (this includes techniques or standardized protocols for targeted exams where dose is matched to indication/reason for exam: i.e. extremities or head);  iterative reconstruction technique.      Electronically signed by: Demar Roberts  Date:    09/09/2022  Time:    17:16               Narrative:    EXAMINATION:  CT MAXILLOFACIAL WITHOUT CONTRAST    CLINICAL HISTORY:  Maxillofacial pain;    TECHNIQUE:  Low dose CT images throughout the region of the facial bones.  Axial, sagittal and coronal reformations were obtained.  Contrast was not administered.    COMPARISON:  None    FINDINGS:  No focal soft tissue swelling identified.  The globes and intraorbital contents are within normal limits.  No orbital fracture.    The remainder of the facial bones appear intact without evidence of an acute displaced fracture.  No osseous destructive lesions.    Degenerative change of the temporomandibular joints.    Mastoid air cells are clear.  Patchy opacification of the frontal sinuses, nonspecific.  Correlation advised.    Carotid atherosclerosis.                                       CT Head Without Contrast (Final result)  Result time 09/09/22 17:17:25      Final result by Demar Roberts MD (09/09/22  17:17:25)                   Impression:      No definite acute intracranial CT abnormality on this motion degraded exam.    Remote left cerebellar encephalomalacia.    Clinical correlation and further evaluation with MRI as clinically warranted.    All CT scans at this facility are performed  using dose modulation techniques as appropriate to performed exam including the following:  automated exposure control; adjustment of mA and/or kV according to the patients size (this includes techniques or standardized protocols for targeted exams where dose is matched to indication/reason for exam: i.e. extremities or head);  iterative reconstruction technique.      Electronically signed by: Demar Roberts  Date:    09/09/2022  Time:    17:17               Narrative:    EXAMINATION:  CT HEAD WITHOUT CONTRAST    CLINICAL HISTORY:  Mental status change, unknown cause;    TECHNIQUE:  Low dose axial CT images obtained throughout the head without intravenous contrast. Sagittal and coronal reconstructions were performed.    COMPARISON:  None.    FINDINGS:  Intracranial compartment:    Motion degrades the evaluation.    Ventricles and sulci are normal in size for age without evidence of hydrocephalus. No extra-axial blood or fluid collections.    Mild microvascular ischemic change.  Remote left cerebellar encephalomalacia.  No parenchymal mass, hemorrhage, edema or major vascular distribution infarct.    Skull/extracranial contents (limited evaluation): No fracture. Mastoids are clear.  Partial opacification of the frontal sinuses.                                       CT Cervical Spine Without Contrast (Final result)  Result time 09/09/22 17:12:45      Final result by Demar Roberts MD (09/09/22 17:12:45)                   Impression:      No fracture or traumatic malalignment of the cervical spine.    Multifocal degenerative changes as detailed above.  Clinical correlation and further evaluation as warranted.    Motion degrades  the evaluation.    All CT scans at this facility are performed  using dose modulation techniques as appropriate to performed exam including the following:  automated exposure control; adjustment of mA and/or kV according to the patients size (this includes techniques or standardized protocols for targeted exams where dose is matched to indication/reason for exam: i.e. extremities or head);  iterative reconstruction technique.      Electronically signed by: Demar Roberts  Date:    09/09/2022  Time:    17:12               Narrative:    EXAMINATION:  CT CERVICAL SPINE WITHOUT CONTRAST    CLINICAL HISTORY:  Neck pain, acute, infection suspected;    TECHNIQUE:  Low dose axial CT images through the cervical spine, with sagittal and coronal reformations.  Contrast was not administered.    COMPARISON:  No dedicated priors.    FINDINGS:  The vertebral bodies are normal in height and morphology without evidence of fracture or osseous destructive process.  Normal sagittal alignment is preserved.    Posterior disc osteophyte complexes most notable at C3-4 with moderate spinal canal stenosis and C6-7 with mild to moderate spinal canal stenosis.  Other levels are better preserved.    Facet and uncovertebral joint arthropathy produce scattered mild and moderate neural foraminal narrowing.    Motion degrades the exam.    Limited evaluation of the intraspinal contents demonstrates no hematoma or mass.Paraspinal soft tissues exhibit no acute abnormalities.                                       The EKG was ordered, reviewed, and independently interpreted by the ED provider.  Interpretation time: 17:18  Rate: 62 BPM  Rhythm: Sinus rhythm with 1st degree A-V block  Interpretation: Left axis deviation. Low voltage QRS. Cannot rule out Anterior infarct, age undetermined. No STEMI.             The Emergency Provider reviewed the vital signs and test results, which are outlined above.     ED Discussion       5:45 PM: Discussed pt's case  with Jill Senior MD (Neurology) who recommends an MRA and MRI.     8:00 PM: Per MRi team, cannot get MRi due to her spinal stimulator not being MRI compatable. Dr. Sneior alerted, and okay with holding any further emergent imaging as patient is improving.     8:42 PM: Discussed case with Santino Gurrola MD, FACP ([facility] Medicine). Dr. Hernández agrees with current care and management of pt and accepts admission.   Admitting Service: Hospital Medicine  Admitting Physician: Dr. Hernández  Admit to: OBS Med/Surgery     8:42 PM: Re-evaluated pt. I have discussed test results, shared treatment plan, and the need for admission with patient and family at bedside. Pt and family express understanding at this time and agree with all information. All questions answered. Pt and family have no further questions or concerns at this time. Pt is ready for admit.     ED Course as of 09/09/22 2119   Fri Sep 09, 2022   2015 Patient has a [employer] device which is not compatible with our MRI machine.  [BA]      ED Course User Index  [BA] Andres Solomon MD     Medical Decision Making:   Clinical Tests:   Lab Tests: Ordered and Reviewed  Radiological Study: Ordered and Reviewed  Medical Tests: Ordered and Reviewed         ED Medication(s):  Medications   lorazepam injection 0.5 mg (0.5 mg Intravenous Given 9/9/22 1659)   nitroGLYCERIN 2% TD oint ointment 1 inch (1 inch Transdermal Given 9/9/22 1906)   lactated ringers bolus 500 mL (0 mLs Intravenous Stopped 9/9/22 1851)       New Prescriptions    No medications on file               Scribe Attestation:   Scribe #1: I performed the above scribed service and the documentation accurately describes the services I performed. I attest to the accuracy of the note.     Attending:   Physician Attestation Statement for Scribe #1: I, Andres Solomon MD, personally performed the services described in this documentation, as scribed by Duran Calvo, in my presence, and it is both accurate and complete.            Clinical Impression       ICD-10-CM ICD-9-CM   1. Closed fracture of transverse process of lumbar vertebra, initial encounter  S32.009A 805.4   2. Right hip pain  M25.551 719.45   3. Acute midline low back pain without sciatica  M54.50 724.2   4. Fall, initial encounter  W19.XXXA E888.9   5. DEMARCO (acute kidney injury)  N17.9 584.9   6. Myoclonic jerking  G25.3 333.2   7. Laceration of tongue, initial encounter  S01.512A 873.64       Disposition:   Disposition: Placed in Observation  Condition: Isai Solomon MD  09/09/22 5565

## 2022-09-09 NOTE — ASSESSMENT & PLAN NOTE
78 y/o woman found down with myoclonic like movements, aphasia.  Symptoms improved after ED arrival, however now lethargic s/p ativan.  Consider over treatment with opiates.  However als recommend MRI/MRA head/neck to evaluate for infarct.

## 2022-09-09 NOTE — HPI
76 y/o woman with prior stroke, chronic pain, HTN who presnets with AMS.  Patient was last normal at 8:30 am this morning (spoke to her son on the phone).  This afternoon her [employer] group came over and found her on the floor of her house.  EMS noted jerking of b/l arm and aphasia.  She received IV ativan prior to CT.

## 2022-09-09 NOTE — SUBJECTIVE & OBJECTIVE
"  Woke up with symptoms?: yes    Recent bleeding noted: no  Does the patient take any Blood Thinners? no  Medications: Antiplatelets:  clopidogrel/Plavix      Past Medical History: hypertension and stroke    Past Surgical History: no relevant surgical history    Family History: hypertension    Social History: no smoking, no drinking, no drugs    Allergies: Stadol [Butorphanol Tartrate]  Sulfa (Sulfonamide Antibiotics) No relevant allergies    Review of Systems   Unable to perform ROS: Mental status change     Objective:   Vitals: Blood pressure (!) 170/100, pulse 64, temperature 98.9 °F (37.2 °C), temperature source Axillary, resp. rate 18, height 4' 9" (1.448 m), weight 53.9 kg (118 lb 13.3 oz), SpO2 98 %. Height: .    CT READ: Yes  No hemmorhage. No mass effect. No early infarct signs.  motion degraded.  Chronic L cerebellar stroke    Physical Exam  Constitutional:       General: She is not in acute distress.     Comments: lethargic   HENT:      Head: Normocephalic and atraumatic.   Eyes:      Extraocular Movements: EOM normal.      Pupils: Pupils are equal, round, and reactive to light.   Pulmonary:      Effort: Pulmonary effort is normal.   Neurological:      Comments: Arouses to touch.  Requires repeated stimulation to attending.  Oriented to age and month.  Follows simple commands.  Both arms antigravity briefly.  Legs antigravity.  No abnl movts noted           "

## 2022-09-09 NOTE — CONSULTS
[facility]  Vascular Neurology  Comprehensive Stroke Center  TeleVascular Neurology Acute Consultation Note      Consults    Consulting Provider: EDEN JOLLY  Current Providers  No providers found    Patient Location:  [facility] EMERGENCY DEPARTMENT Emergency Department  Spoke hospital nurse at bedside with patient assisting consultant.     Patient information was obtained from relative(s) and EMS personnel.         Assessment/Plan:       Diagnoses:   Encephalopathy acute  78 y/o woman found down with myoclonic like movements, aphasia.  Symptoms improved after ED arrival, however now lethargic s/p ativan.  Consider over treatment with opiates.  However als recommend MRI/MRA head/neck to evaluate for infarct.        STROKE DOCUMENTATION     Acute Stroke Times:   Acute Stroke Times   Last Known Normal Date: 09/09/22  Last Known Normal Time: 0830  Symptom Onset Date: 09/09/22  Symptom Onset Time: 0830  Stroke Team Called Date: 09/09/22  Stroke Team Called Time: 1702  Stroke Team Arrival Date: 09/09/22  Stroke Team Arrival Time: 1720  CT Interpretation Time: 1720  Alteplase Recommended: No  Thrombectomy Recommended: No    NIH Scale:  Interval: baseline (s/p lorazepam IV)  1a. Level of Consciousness: 2-->Not alert, requires repeated stimulation to attend, or is obtunded and requires strong or painful stimulation to make movements (not stereotyped)  1b. LOC Questions: 0-->Answers both questions correctly  1c. LOC Commands: 0-->Performs both tasks correctly  2. Best Gaze: 0-->Normal  3. Visual: 0-->No visual loss  4. Facial Palsy: 0-->Normal symmetrical movements  5a. Motor Arm, Left: 1-->Drift, limb holds 90 (or 45) degrees, but drifts down before full 10 seconds, does not hit bed or other support  5b. Motor Arm, Right: 1-->Drift, limb holds 90 (or 45) degrees, but drifts down before full 10 secs, does not hit bed or other support  6a. Motor Leg, Left: 2-->Some effort against gravity,  "leg falls to bed by 5 secs, but has some effort against gravity  6b. Motor Leg, Right: 2-->Some effort against gravity, leg falls to bed by 5 secs, but has some effort against gravity  7. Limb Ataxia: 0-->Absent  8. Sensory: 0-->Normal, no sensory loss  9. Best Language: 1-->Mild-to-moderate aphasia, some obvious loss of fluency or facility of comprehension, without significant limitation on ideas expressed or form of expression. Reduction of speech and/or comprehension, however, makes conversation. . . (see row details)  10. Dysarthria: 0-->Normal  11. Extinction and Inattention (formerly Neglect): 0-->No abnormality  Total (NIH Stroke Scale): 9     Modified [place]    Mir Coma Scale:13   ABCD2 Score:    [id number] Score:   HAS -BLED Score:   ICH Score:   Hunt & Lyons Classification:       Blood pressure (!) 163/74, pulse (!) 57, temperature 98.9 °F (37.2 °C), temperature source Axillary, resp. rate 19, height 4' 9" (1.448 m), weight 53.9 kg (118 lb 13.3 oz), SpO2 95 %.  Alteplase Eligible?: No  Alteplase Recommendation: Alteplase not recommended due to Outside of treatment window   Possible Interventional Revascularization Candidate? Yes    Disposition Recommendation: admit to inpatient    Subjective:     History of Present Illness:  76 y/o woman with prior stroke, chronic pain, HTN who presnets with AMS.  Patient was last normal at 8:30 am this morning (spoke to her son on the phone).  This afternoon her [employer] group came over and found her on the floor of her house.  EMS noted jerking of b/l arm and aphasia.  She received IV ativan prior to CT.      Woke up with symptoms?: yes    Recent bleeding noted: no  Does the patient take any Blood Thinners? no  Medications: Antiplatelets:  clopidogrel/Plavix      Past Medical History: hypertension and stroke    Past Surgical History: no relevant surgical history    Family History: hypertension    Social History: no smoking, no drinking, no drugs    Allergies: Stadol " "[Butorphanol Tartrate]  Sulfa (Sulfonamide Antibiotics) No relevant allergies    Review of Systems   Unable to perform ROS: Mental status change     Objective:   Vitals: Blood pressure (!) 170/100, pulse 64, temperature 98.9 °F (37.2 °C), temperature source Axillary, resp. rate 18, height 4' 9" (1.448 m), weight 53.9 kg (118 lb 13.3 oz), SpO2 98 %. Height: .    CT READ: Yes  No hemmorhage. No mass effect. No early infarct signs.  motion degraded.  Chronic L cerebellar stroke    Physical Exam  Constitutional:       General: She is not in acute distress.     Comments: lethargic   HENT:      Head: Normocephalic and atraumatic.   Eyes:      Extraocular Movements: EOM normal.      Pupils: Pupils are equal, round, and reactive to light.   Pulmonary:      Effort: Pulmonary effort is normal.   Neurological:      Comments: Arouses to touch.  Requires repeated stimulation to attending.  Oriented to age and month.  Follows simple commands.  Both arms antigravity briefly.  Legs antigravity.  No abnl movts noted             Recommended the emergency room physician to have a brief discussion with the patient and/or family if available regarding the  risks and benefits of treatment, and to briefly document the occurrence of that discussion in his clinical encounter note.     The attending portion of this evaluation, treatment, and documentation was performed per Jill Senior MD via audiovisual.    Billing code:  (non-intervention mild to moderate stroke, TIA, some mimics)      This patient has a critical neurological condition/illness, with some potential for high morbidity and mortality.  There is a moderate probability for acute neurological change leading to clinical and possibly life-threatening deterioration requiring highest level of physician preparedness for urgent intervention.  Care was coordinated with other physicians involved in the patient's care.  Radiologic studies and laboratory data were reviewed and " interpreted, and plan of care was re-assessed based on the results.  Diagnosis, treatment options and prognosis may have been discussed with the patient and/or family members or caregiver.    In your opinion, this was a: Tier 2 Van Negative    Consult End Time: 6:01 PM     Jill Senior MD  [facility]  Vascular Neurology   [facility]

## 2022-09-10 VITALS
RESPIRATION RATE: 18 BRPM | WEIGHT: 117.31 LBS | TEMPERATURE: 98 F | SYSTOLIC BLOOD PRESSURE: 174 MMHG | HEART RATE: 61 BPM | DIASTOLIC BLOOD PRESSURE: 79 MMHG | HEIGHT: 57 IN | BODY MASS INDEX: 25.31 KG/M2 | OXYGEN SATURATION: 99 %

## 2022-09-10 PROBLEM — G93.41 ACUTE METABOLIC ENCEPHALOPATHY: Status: RESOLVED | Noted: 2022-09-09 | Resolved: 2022-09-10

## 2022-09-10 LAB
ALBUMIN SERPL BCP-MCNC: 3.1 G/DL (ref 3.5–5.2)
ALP SERPL-CCNC: 157 U/L (ref 55–135)
ALT SERPL W/O P-5'-P-CCNC: 18 U/L (ref 10–44)
ANION GAP SERPL CALC-SCNC: 9 MMOL/L (ref 8–16)
AST SERPL-CCNC: 27 U/L (ref 10–40)
BASOPHILS # BLD AUTO: 0.07 K/UL (ref 0–0.2)
BASOPHILS NFR BLD: 0.8 % (ref 0–1.9)
BILIRUB SERPL-MCNC: 0.6 MG/DL (ref 0.1–1)
BUN SERPL-MCNC: 18 MG/DL (ref 8–23)
CALCIUM SERPL-MCNC: 8.7 MG/DL (ref 8.7–10.5)
CHLORIDE SERPL-SCNC: 109 MMOL/L (ref 95–110)
CHOLEST SERPL-MCNC: 145 MG/DL (ref 120–199)
CHOLEST/HDLC SERPL: 3.2 {RATIO} (ref 2–5)
CO2 SERPL-SCNC: 20 MMOL/L (ref 23–29)
CREAT SERPL-MCNC: 1.6 MG/DL (ref 0.5–1.4)
DIFFERENTIAL METHOD: ABNORMAL
EOSINOPHIL # BLD AUTO: 0.4 K/UL (ref 0–0.5)
EOSINOPHIL NFR BLD: 4.2 % (ref 0–8)
ERYTHROCYTE [DISTWIDTH] IN BLOOD BY AUTOMATED COUNT: 18.1 % (ref 11.5–14.5)
EST. GFR  (NO RACE VARIABLE): 33 ML/MIN/1.73 M^2
GLUCOSE SERPL-MCNC: 84 MG/DL (ref 70–110)
HCT VFR BLD AUTO: 34.6 % (ref 37–48.5)
HDLC SERPL-MCNC: 46 MG/DL (ref 40–75)
HDLC SERPL: 31.7 % (ref 20–50)
HGB BLD-MCNC: 10.7 G/DL (ref 12–16)
IMM GRANULOCYTES # BLD AUTO: 0.03 K/UL (ref 0–0.04)
IMM GRANULOCYTES NFR BLD AUTO: 0.4 % (ref 0–0.5)
LDLC SERPL CALC-MCNC: 74.4 MG/DL (ref 63–159)
LYMPHOCYTES # BLD AUTO: 3 K/UL (ref 1–4.8)
LYMPHOCYTES NFR BLD: 35.7 % (ref 18–48)
MAGNESIUM SERPL-MCNC: 1.9 MG/DL (ref 1.6–2.6)
MCH RBC QN AUTO: 29.6 PG (ref 27–31)
MCHC RBC AUTO-ENTMCNC: 30.9 G/DL (ref 32–36)
MCV RBC AUTO: 96 FL (ref 82–98)
MONOCYTES # BLD AUTO: 1 K/UL (ref 0.3–1)
MONOCYTES NFR BLD: 11.9 % (ref 4–15)
NEUTROPHILS # BLD AUTO: 3.9 K/UL (ref 1.8–7.7)
NEUTROPHILS NFR BLD: 47 % (ref 38–73)
NONHDLC SERPL-MCNC: 99 MG/DL
NRBC BLD-RTO: 0 /100 WBC
PHOSPHATE SERPL-MCNC: 2.9 MG/DL (ref 2.7–4.5)
PLATELET # BLD AUTO: 358 K/UL (ref 150–450)
PMV BLD AUTO: 10.6 FL (ref 9.2–12.9)
POTASSIUM SERPL-SCNC: 4.4 MMOL/L (ref 3.5–5.1)
PROT SERPL-MCNC: 6.8 G/DL (ref 6–8.4)
RBC # BLD AUTO: 3.62 M/UL (ref 4–5.4)
SODIUM SERPL-SCNC: 138 MMOL/L (ref 136–145)
TRIGL SERPL-MCNC: 123 MG/DL (ref 30–150)
TROPONIN I SERPL DL<=0.01 NG/ML-MCNC: 0.02 NG/ML (ref 0–0.03)
TROPONIN I SERPL DL<=0.01 NG/ML-MCNC: 0.04 NG/ML (ref 0–0.03)
WBC # BLD AUTO: 8.31 K/UL (ref 3.9–12.7)

## 2022-09-10 PROCEDURE — 84484 ASSAY OF TROPONIN QUANT: CPT | Mod: 91 | Performed by: INTERNAL MEDICINE

## 2022-09-10 PROCEDURE — 96361 HYDRATE IV INFUSION ADD-ON: CPT

## 2022-09-10 PROCEDURE — 63600175 PHARM REV CODE 636 W HCPCS: Performed by: INTERNAL MEDICINE

## 2022-09-10 PROCEDURE — 80053 COMPREHEN METABOLIC PANEL: CPT | Performed by: INTERNAL MEDICINE

## 2022-09-10 PROCEDURE — 83735 ASSAY OF MAGNESIUM: CPT | Performed by: INTERNAL MEDICINE

## 2022-09-10 PROCEDURE — 84100 ASSAY OF PHOSPHORUS: CPT | Performed by: INTERNAL MEDICINE

## 2022-09-10 PROCEDURE — 25000003 PHARM REV CODE 250: Performed by: INTERNAL MEDICINE

## 2022-09-10 PROCEDURE — 96372 THER/PROPH/DIAG INJ SC/IM: CPT | Mod: 59 | Performed by: INTERNAL MEDICINE

## 2022-09-10 PROCEDURE — G0378 HOSPITAL OBSERVATION PER HR: HCPCS

## 2022-09-10 PROCEDURE — 25000003 PHARM REV CODE 250: Performed by: NURSE PRACTITIONER

## 2022-09-10 PROCEDURE — 63600175 PHARM REV CODE 636 W HCPCS: Performed by: NURSE PRACTITIONER

## 2022-09-10 PROCEDURE — 96365 THER/PROPH/DIAG IV INF INIT: CPT

## 2022-09-10 PROCEDURE — 94761 N-INVAS EAR/PLS OXIMETRY MLT: CPT

## 2022-09-10 PROCEDURE — 36415 COLL VENOUS BLD VENIPUNCTURE: CPT | Performed by: INTERNAL MEDICINE

## 2022-09-10 PROCEDURE — 85025 COMPLETE CBC W/AUTO DIFF WBC: CPT | Performed by: INTERNAL MEDICINE

## 2022-09-10 PROCEDURE — 84484 ASSAY OF TROPONIN QUANT: CPT | Performed by: INTERNAL MEDICINE

## 2022-09-10 RX ORDER — SPIRONOLACTONE 25 MG/1
50 TABLET ORAL DAILY
Status: DISCONTINUED | OUTPATIENT
Start: 2022-09-10 | End: 2022-09-10 | Stop reason: HOSPADM

## 2022-09-10 RX ORDER — METOPROLOL SUCCINATE 25 MG/1
25 TABLET, EXTENDED RELEASE ORAL 2 TIMES DAILY
Status: DISCONTINUED | OUTPATIENT
Start: 2022-09-10 | End: 2022-09-10 | Stop reason: HOSPADM

## 2022-09-10 RX ORDER — ISOSORBIDE MONONITRATE 30 MG/1
30 TABLET, EXTENDED RELEASE ORAL DAILY
Status: DISCONTINUED | OUTPATIENT
Start: 2022-09-10 | End: 2022-09-10 | Stop reason: HOSPADM

## 2022-09-10 RX ORDER — AMLODIPINE BESYLATE 5 MG/1
5 TABLET ORAL DAILY
Status: DISCONTINUED | OUTPATIENT
Start: 2022-09-10 | End: 2022-09-10 | Stop reason: HOSPADM

## 2022-09-10 RX ORDER — ATORVASTATIN CALCIUM 40 MG/1
80 TABLET, FILM COATED ORAL DAILY
Status: DISCONTINUED | OUTPATIENT
Start: 2022-09-10 | End: 2022-09-10 | Stop reason: HOSPADM

## 2022-09-10 RX ORDER — AMOXICILLIN AND CLAVULANATE POTASSIUM 875; 125 MG/1; MG/1
1 TABLET, FILM COATED ORAL 2 TIMES DAILY
Qty: 14 TABLET | Refills: 0 | Status: CANCELLED | OUTPATIENT
Start: 2022-09-10 | End: 2022-09-17

## 2022-09-10 RX ORDER — HYDROCODONE BITARTRATE AND ACETAMINOPHEN 7.5; 325 MG/1; MG/1
1 TABLET ORAL EVERY 6 HOURS PRN
Status: DISCONTINUED | OUTPATIENT
Start: 2022-09-10 | End: 2022-09-10 | Stop reason: HOSPADM

## 2022-09-10 RX ORDER — DULOXETIN HYDROCHLORIDE 20 MG/1
20 CAPSULE, DELAYED RELEASE ORAL DAILY
Status: DISCONTINUED | OUTPATIENT
Start: 2022-09-10 | End: 2022-09-10 | Stop reason: HOSPADM

## 2022-09-10 RX ORDER — CLOPIDOGREL BISULFATE 75 MG/1
75 TABLET ORAL DAILY
Status: DISCONTINUED | OUTPATIENT
Start: 2022-09-10 | End: 2022-09-10 | Stop reason: HOSPADM

## 2022-09-10 RX ORDER — AMOXICILLIN AND CLAVULANATE POTASSIUM 500; 125 MG/1; MG/1
1 TABLET, FILM COATED ORAL 2 TIMES DAILY
Qty: 10 TABLET | Refills: 0 | Status: SHIPPED | OUTPATIENT
Start: 2022-09-10 | End: 2022-09-15

## 2022-09-10 RX ORDER — VALSARTAN 160 MG/1
320 TABLET ORAL DAILY
Status: DISCONTINUED | OUTPATIENT
Start: 2022-09-10 | End: 2022-09-10 | Stop reason: HOSPADM

## 2022-09-10 RX ADMIN — ISOSORBIDE MONONITRATE 30 MG: 30 TABLET, EXTENDED RELEASE ORAL at 12:09

## 2022-09-10 RX ADMIN — CLOPIDOGREL 75 MG: 75 TABLET, FILM COATED ORAL at 12:09

## 2022-09-10 RX ADMIN — AMLODIPINE BESYLATE 5 MG: 5 TABLET ORAL at 12:09

## 2022-09-10 RX ADMIN — METOPROLOL SUCCINATE 25 MG: 25 TABLET, FILM COATED, EXTENDED RELEASE ORAL at 12:09

## 2022-09-10 RX ADMIN — ENOXAPARIN SODIUM 30 MG: 30 INJECTION SUBCUTANEOUS at 12:09

## 2022-09-10 RX ADMIN — SODIUM CHLORIDE: 0.9 INJECTION, SOLUTION INTRAVENOUS at 09:09

## 2022-09-10 RX ADMIN — DULOXETINE HYDROCHLORIDE 20 MG: 20 CAPSULE, DELAYED RELEASE ORAL at 12:09

## 2022-09-10 RX ADMIN — ACETAMINOPHEN 650 MG: 325 TABLET ORAL at 09:09

## 2022-09-10 RX ADMIN — VALSARTAN 320 MG: 160 TABLET, FILM COATED ORAL at 12:09

## 2022-09-10 RX ADMIN — ATORVASTATIN CALCIUM 80 MG: 40 TABLET, FILM COATED ORAL at 12:09

## 2022-09-10 RX ADMIN — SPIRONOLACTONE 50 MG: 25 TABLET, FILM COATED ORAL at 12:09

## 2022-09-10 RX ADMIN — CEFTRIAXONE 1 G: 1 INJECTION, SOLUTION INTRAVENOUS at 11:09

## 2022-09-10 RX ADMIN — SODIUM CHLORIDE: 0.9 INJECTION, SOLUTION INTRAVENOUS at 12:09

## 2022-09-10 NOTE — CONSULTS
[facility] Brief assessment completed.

## 2022-09-10 NOTE — ASSESSMENT & PLAN NOTE
-no evidence of cva on CT head  -unable to obtian MRI due to spinal stimulator  -unable to obtain CTA due to DEMARCO  -tele neurologist evaluated  -neuro checks  -cva orderset

## 2022-09-10 NOTE — ASSESSMENT & PLAN NOTE
Patient with acute kidney injury likely due to IVVD/dehydration DEMARCO is currently worsening. Labs reviewed- Renal function/electrolytes with Estimated Creatinine Clearance: 17.5 mL/min (A) (based on SCr of 1.9 mg/dL (H)). according to latest data. Monitor urine output and serial BMP and adjust therapy as needed. Avoid nephrotoxins and renally dose meds for GFR listed above.     -continue IV fluids

## 2022-09-10 NOTE — HOSPITAL COURSE
9/10/22 No acute events overnight. The patient reports improvement in symptoms. The patient is back to her baseline mental status per her son at bedside. Renal function is improved. The patient is unable to get an MRI brain secondary to a spinal stimulator. Repeat CT head today was also negative for acute CVA. The patient is being treated for a UTI. The patient was seen and examined today and deemed stable for discharge. Acute mental status changes were likely secondary to UTI. She will complete a 5 day course of augmentin and follow up with her PCP.

## 2022-09-10 NOTE — PROGRESS NOTES
Pharmacist Renal Dose Adjustment Note    Lilia Tong is a 77 y.o. female being treated with the medication lovenox    Patient Data:    Vital Signs (Most Recent):  Temp: 97.8 °F (36.6 °C) (09/09/22 2201)  Pulse: (!) 57 (09/09/22 2315)  Resp: 20 (09/09/22 2201)  BP: (!) 114/56 (09/09/22 2315)  SpO2: 98 % (09/09/22 2201)   Vital Signs (72h Range):  Temp:  [97.8 °F (36.6 °C)-98.9 °F (37.2 °C)]   Pulse:  [53-64]   Resp:  [18-20]   BP: (114-209)/()   SpO2:  [95 %-98 %]      Recent Labs   Lab 09/09/22  1651   CREATININE 1.9*     Serum creatinine: 1.9 mg/dL (H) 09/09/22 1651  Estimated creatinine clearance: 17.5 mL/min (A)    Medication:lovenox dose: 40mg frequency daily will be changed to medication:lovenox dose:30mg frequency:daily    Pharmacist's Name: Ambrocio Plaza  Pharmacist's Extension: [phone]     No

## 2022-09-10 NOTE — NURSING
Pt was given written and oral education. Pt verbalizes understanding. Pt IV was removed, catheter intact. Pt was stable. Pt discharged with sons.

## 2022-09-10 NOTE — H&P
[facility] Medicine  History & Physical    Patient Name: Lilia Tong  MRN: [id number]  Patient Class: OP- Observation  Admission Date: 9/9/2022  Attending Physician: Bobo Hernández MD   Primary Care Provider: Santino Casas MD         Patient information was obtained from patient, past medical records and ER records.     Subjective:     Principal Problem:Acute metabolic encephalopathy    Chief Complaint:   Chief Complaint   Patient presents with    Cerebrovascular Accident     LKW of 0830 am pt unable to speak, found by neighbor, takes plavix, pmhx of stroke        HPI: Ms. Tong is a 77-year-old  female with PMH significant for prior CVA, HTN, CKD stage 3, opioid dependent with multiple prior overdoses, was found on the floor at our holes, last seen normal at around 8:30 a.m. this morning.  Patient remembers falling, was noted to have dry blood in the mouth, likely due to biting the tongue.  On EMS arrival, patient was having myoclonic jerking and was not able to speak.  Evaluated in the ED.  Tele neurology recommended CTA and MRI however unable to obtain CTA due to acute kidney injury (creatinine 1.9, baseline 1.3), unable to obtain MRI brain due to presence of spinal stimulator.  CT of the lumbar spine reveals L1 transverse process fracture, stable.  Patient is a very poor historian.  Opens her eyes, answers some questions, but unable to obtain full information.  Moves bilateral upper and lower extremities.       Past Medical History:   Diagnosis Date    Hypertension     Stroke        History reviewed. No pertinent surgical history.    Review of patient's allergies indicates:   Allergen Reactions    Stadol [butorphanol tartrate] Hives and Hallucinations    Sulfa (sulfonamide antibiotics) Rash       No current facility-administered medications on file prior to encounter.     Current Outpatient Medications on File Prior to Encounter   Medication Sig    amLODIPine (NORVASC) 10  MG tablet Take 5 mg by mouth once daily.    busPIRone (BUSPAR) 10 MG tablet Take 10 mg by mouth 2 (two) times daily.    clopidogreL (PLAVIX) 75 mg tablet Take 75 mg by mouth once daily.    DULoxetine (CYMBALTA) 20 MG capsule Take 20 mg by mouth once daily.    furosemide (LASIX) 20 MG tablet Take 20 mg by mouth once daily.    gabapentin (NEURONTIN) 300 MG capsule Take 300 mg by mouth 3 (three) times daily.    hydrOXYzine HCL (ATARAX) 10 MG Tab Take 10 mg by mouth 2 (two) times daily as needed.    ibuprofen (ADVIL,MOTRIN) 600 MG tablet Take 600 mg by mouth 3 (three) times daily.    isosorbide mononitrate (IMDUR) 30 MG 24 hr tablet     lactulose (CHRONULAC) 10 gram/15 mL solution     metoprolol succinate (TOPROL-XL) 25 MG 24 hr tablet Take 25 mg by mouth 2 (two) times daily.    morphine (MSIR) 15 MG tablet morphine 15 mg immediate release tablet   Take 1 tablet 3 times a day by oral route as needed for 30 days.    MOVANTIK 25 mg tablet Take 25 mg by mouth every morning.    omeprazole (PRILOSEC) 40 MG capsule Take 40 mg by mouth once daily.    ondansetron (ZOFRAN) 8 MG tablet     oxyCODONE (ROXICODONE) 15 MG Tab Take 15 mg by mouth 2 (two) times daily.    rosuvastatin (CRESTOR) 40 MG Tab     spironolactone (ALDACTONE) 50 MG tablet Take 50 mg by mouth once daily.    tiZANidine (ZANAFLEX) 4 MG tablet Take 4 mg by mouth 2 (two) times daily as needed.    topiramate (TOPAMAX) 25 MG tablet Take 25 mg by mouth once daily.    traZODone (DESYREL) 50 MG tablet Take 50 mg by mouth nightly as needed.     Family History       Problem Relation (Age of Onset)    Hypertension Father          Tobacco Use    Smoking status: Never    Smokeless tobacco: Never   Substance and Sexual Activity    Alcohol use: Not on file    Drug use: Not on file    Sexual activity: Not on file     Review of Systems   Unable to perform ROS: Mental status change   Objective:     Vital Signs (Most Recent):  Temp: 98.9 °F (37.2 °C)  (09/09/22 1636)  Pulse: (!) 53 (09/09/22 1936)  Resp: 19 (09/09/22 1747)  BP: (!) 188/74 (09/09/22 1936)  SpO2: 98 % (09/09/22 1936)   Vital Signs (24h Range):  Temp:  [98.9 °F (37.2 °C)] 98.9 °F (37.2 °C)  Pulse:  [53-64] 53  Resp:  [18-19] 19  SpO2:  [95 %-98 %] 98 %  BP: (163-195)/() 188/74     Weight: 53.9 kg (118 lb 13.3 oz)  Body mass index is 25.71 kg/m².    Physical Exam  Vitals and nursing note reviewed.   Constitutional:       Appearance: She is ill-appearing.   HENT:      Head: Normocephalic and atraumatic.      Mouth/Throat:      Mouth: Mucous membranes are moist.      Comments: Dried blood noted in mouth  Eyes:      General: No scleral icterus.     Conjunctiva/sclera: Conjunctivae normal.   Cardiovascular:      Rate and Rhythm: Normal rate and regular rhythm.      Heart sounds: No murmur heard.  Pulmonary:      Effort: Pulmonary effort is normal. No respiratory distress.      Breath sounds: No wheezing.   Abdominal:      Palpations: Abdomen is soft.      Tenderness: There is no abdominal tenderness.   Musculoskeletal:         General: No swelling.      Cervical back: Neck supple.   Skin:     General: Skin is warm.      Coloration: Skin is not jaundiced.      Findings: Bruising present.   Neurological:      Mental Status: She is oriented to person, place, and time and easily aroused.      Comments: Opens eyes to verbal commands, but is disoriented.  Able to move bilateral upper and lower extremities to verbal commands.   Psychiatric:         Attention and Perception: She is inattentive.         Mood and Affect: Affect is flat.         Speech: Speech is delayed.           Significant Labs: All pertinent labs within the past 24 hours have been reviewed.  Bilirubin:   Recent Labs   Lab 09/09/22  1651   BILITOT 0.6     Blood Culture: No results for input(s): LABBLOO in the last 48 hours.  BMP:   Recent Labs   Lab 09/09/22  1651   *      K 4.3      CO2 21*   BUN 22   CREATININE 1.9*    CALCIUM 8.9     CBC:   Recent Labs   Lab 09/09/22  1651   WBC 8.20   HGB 10.5*   HCT 32.1*        CMP:   Recent Labs   Lab 09/09/22  1651      K 4.3      CO2 21*   *   BUN 22   CREATININE 1.9*   CALCIUM 8.9   PROT 7.2   ALBUMIN 3.4*   BILITOT 0.6   ALKPHOS 168*   AST 28   ALT 22   ANIONGAP 9     Cardiac Markers:   Recent Labs   Lab 09/09/22  1651   *     Troponin:   Recent Labs   Lab 09/09/22  1651   TROPONINI 0.028*     Urine Studies:   Recent Labs   Lab 09/09/22  1805   COLORU Yellow   APPEARANCEUA Hazy*   PHUR 6.0   SPECGRAV 1.010   PROTEINUA 1+*   GLUCUA Negative   KETONESU Negative   BILIRUBINUA Negative   OCCULTUA Negative   NITRITE Negative   UROBILINOGEN Negative   LEUKOCYTESUR Trace*   RBCUA 0   WBCUA 3   BACTERIA Moderate*   SQUAMEPITHEL 2   HYALINECASTS 0       Significant Imaging: I have reviewed all pertinent imaging results/findings within the past 24 hours.  I have reviewed and interpreted all pertinent imaging results/findings within the past 24 hours.    Imaging Results              CT Lumbar Spine Without Contrast (Final result)  Result time 09/09/22 18:58:30      Final result by Demar Roberts MD (09/09/22 18:58:30)                   Impression:      Left L1 transverse process fracture.    Remote appearing vertebral body compression fractures.  No acute vertebral body compression fracture.    Degenerative findings and additional findings as above.    All CT scans at this facility are performed  using dose modulation techniques as appropriate to performed exam including the following:  automated exposure control; adjustment of mA and/or kV according to the patients size (this includes techniques or standardized protocols for targeted exams where dose is matched to indication/reason for exam: i.e. extremities or head);  iterative reconstruction technique.      Electronically signed by: Demar Roberts  Date:    09/09/2022  Time:    18:58               Narrative:     EXAMINATION:  CT LUMBAR SPINE WITHOUT CONTRAST    CLINICAL HISTORY:  Low back pain, trauma;    TECHNIQUE:  Low-dose CT images obtained throughout the region of the lumbar spine.  Axial, sagittal and coronal reformations were performed.  Contrast was not administered.    COMPARISON:  None.    FINDINGS:  Remote appearing L4 and L2 vertebral body compression fractures.  Likely remote T11 vertebral body compression fracture plan vertebral augmentation change at T11 and L4.  No definite acute vertebral body compression fracture identified.  No priors available for direct comparison.    Extensive osseous posterior fusion    Acute minimally displaced fracture of the left L1 transverse process.    Bilateral sacroiliac joint sclerosis.    Normal sagittal alignment is preserved.  No spondylolisthesis.    Multifocal osseous degenerative change without definite severe spinal canal stenosis.  Mild-to-moderate scattered neural foraminal narrowing.    Limited evaluation of the intraspinal contents demonstrates no hematoma or mass.    Paraspinal soft tissues exhibit no acute abnormalities.                                       X-Ray Hip 2 or 3 views Right (with Pelvis when performed) (Final result)  Result time 09/09/22 19:00:20      Final result by Demar Roberts MD (09/09/22 19:00:20)                   Impression:      Degenerative changes without definite acute displaced fracture.      Electronically signed by: Demar Roberts  Date:    09/09/2022  Time:    19:00               Narrative:    EXAMINATION:  XR HIP WITH PELVIS WHEN PERFORMED, 2 OR 3  VIEWS RIGHT    CLINICAL HISTORY:  Pain in right hip    TECHNIQUE:  AP view of the pelvis and frog leg lateral view of the right hip were performed.    COMPARISON:  None    FINDINGS:  No acute displaced right hip fracture identified.    Moderate degenerative change of the hips bilaterally.  No definite acute displaced symphysis pubis fracture or pubic ramus fracture.  Degenerative  changes sacroiliac joints and spine.                                       X-Ray Chest AP Portable (Final result)  Result time 09/09/22 17:51:48      Final result by Demar Roberts MD (09/09/22 17:51:48)                   Impression:      No acute abnormality.      Electronically signed by: Demar Roberts  Date:    09/09/2022  Time:    17:51               Narrative:    EXAMINATION:  XR CHEST AP PORTABLE    CLINICAL HISTORY:  Stroke;    TECHNIQUE:  Single frontal view of the chest was performed.    COMPARISON:  06/09/2021    FINDINGS:  The lungs are clear, with normal appearance of pulmonary vasculature and no pleural effusion or pneumothorax.    The cardiac silhouette is enlarged.  The hilar and mediastinal contours are unremarkable.    Bones are intact.  Spine stimulator in place.                                       CT Maxillofacial Without Contrast (Final result)  Result time 09/09/22 17:16:07      Final result by Demar Roberts MD (09/09/22 17:16:07)                   Impression:      No evidence of an acute displaced fracture.    Additional findings as above.    All CT scans at this facility are performed  using dose modulation techniques as appropriate to performed exam including the following:  automated exposure control; adjustment of mA and/or kV according to the patients size (this includes techniques or standardized protocols for targeted exams where dose is matched to indication/reason for exam: i.e. extremities or head);  iterative reconstruction technique.      Electronically signed by: Demar Roberts  Date:    09/09/2022  Time:    17:16               Narrative:    EXAMINATION:  CT MAXILLOFACIAL WITHOUT CONTRAST    CLINICAL HISTORY:  Maxillofacial pain;    TECHNIQUE:  Low dose CT images throughout the region of the facial bones.  Axial, sagittal and coronal reformations were obtained.  Contrast was not administered.    COMPARISON:  None    FINDINGS:  No focal soft tissue swelling identified.  The  globes and intraorbital contents are within normal limits.  No orbital fracture.    The remainder of the facial bones appear intact without evidence of an acute displaced fracture.  No osseous destructive lesions.    Degenerative change of the temporomandibular joints.    Mastoid air cells are clear.  Patchy opacification of the frontal sinuses, nonspecific.  Correlation advised.    Carotid atherosclerosis.                                       CT Head Without Contrast (Final result)  Result time 09/09/22 17:17:25      Final result by Demar Roberts MD (09/09/22 17:17:25)                   Impression:      No definite acute intracranial CT abnormality on this motion degraded exam.    Remote left cerebellar encephalomalacia.    Clinical correlation and further evaluation with MRI as clinically warranted.    All CT scans at this facility are performed  using dose modulation techniques as appropriate to performed exam including the following:  automated exposure control; adjustment of mA and/or kV according to the patients size (this includes techniques or standardized protocols for targeted exams where dose is matched to indication/reason for exam: i.e. extremities or head);  iterative reconstruction technique.      Electronically signed by: Demar Roberts  Date:    09/09/2022  Time:    17:17               Narrative:    EXAMINATION:  CT HEAD WITHOUT CONTRAST    CLINICAL HISTORY:  Mental status change, unknown cause;    TECHNIQUE:  Low dose axial CT images obtained throughout the head without intravenous contrast. Sagittal and coronal reconstructions were performed.    COMPARISON:  None.    FINDINGS:  Intracranial compartment:    Motion degrades the evaluation.    Ventricles and sulci are normal in size for age without evidence of hydrocephalus. No extra-axial blood or fluid collections.    Mild microvascular ischemic change.  Remote left cerebellar encephalomalacia.  No parenchymal mass, hemorrhage, edema or major  vascular distribution infarct.    Skull/extracranial contents (limited evaluation): No fracture. Mastoids are clear.  Partial opacification of the frontal sinuses.                                       CT Cervical Spine Without Contrast (Final result)  Result time 09/09/22 17:12:45      Final result by Demar Roberts MD (09/09/22 17:12:45)                   Impression:      No fracture or traumatic malalignment of the cervical spine.    Multifocal degenerative changes as detailed above.  Clinical correlation and further evaluation as warranted.    Motion degrades the evaluation.    All CT scans at this facility are performed  using dose modulation techniques as appropriate to performed exam including the following:  automated exposure control; adjustment of mA and/or kV according to the patients size (this includes techniques or standardized protocols for targeted exams where dose is matched to indication/reason for exam: i.e. extremities or head);  iterative reconstruction technique.      Electronically signed by: Demar Roberts  Date:    09/09/2022  Time:    17:12               Narrative:    EXAMINATION:  CT CERVICAL SPINE WITHOUT CONTRAST    CLINICAL HISTORY:  Neck pain, acute, infection suspected;    TECHNIQUE:  Low dose axial CT images through the cervical spine, with sagittal and coronal reformations.  Contrast was not administered.    COMPARISON:  No dedicated priors.    FINDINGS:  The vertebral bodies are normal in height and morphology without evidence of fracture or osseous destructive process.  Normal sagittal alignment is preserved.    Posterior disc osteophyte complexes most notable at C3-4 with moderate spinal canal stenosis and C6-7 with mild to moderate spinal canal stenosis.  Other levels are better preserved.    Facet and uncovertebral joint arthropathy produce scattered mild and moderate neural foraminal narrowing.    Motion degrades the exam.    Limited evaluation of the intraspinal contents  demonstrates no hematoma or mass.Paraspinal soft tissues exhibit no acute abnormalities.                                    I have independently reviewed and interpreted the EKG.     I have independently reviewed all pertinent labs within the past 24 hours.    I have independently reviewed, visualized and interpreted all pertinent imaging results within the past 24 hours and discussed the findings with the ED physician, Andres Solomon MD          Assessment/Plan:     * Acute metabolic encephalopathy  -no evidence of cva on CT head  -unable to obtian MRI due to spinal stimulator  -unable to obtain CTA due to DEMARCO  -tele neurologist evaluated  -neuro checks  -cva orderset      Myoclonus         DEMARCO (acute kidney injury)  Patient with acute kidney injury likely due to IVVD/dehydration DEMARCO is currently worsening. Labs reviewed- Renal function/electrolytes with Estimated Creatinine Clearance: 17.5 mL/min (A) (based on SCr of 1.9 mg/dL (H)). according to latest data. Monitor urine output and serial BMP and adjust therapy as needed. Avoid nephrotoxins and renally dose meds for GFR listed above.     -continue IV fluids      CHANDAN (generalized anxiety disorder)  -ativan IV as needed      HTN (hypertension)  -resume home medications when reconciled  -hydralazine IV as needed      History of stroke  -CT head shows remote left cerebellar encephalomalacia      VTE Risk Mitigation (From admission, onward)         Ordered     enoxaparin injection 40 mg  Daily         09/09/22 2354     Place sequential compression device  Until discontinued         09/09/22 2354                The patient is placed in OBSERVATION status.       Kirill Sanches MD  Department of Hospital Medicine   [facility]

## 2022-09-10 NOTE — PLAN OF CARE
[facility]  Discharge Final Note    Primary Care Provider: Santino Casas MD    Expected Discharge Date: 9/10/2022    Final Discharge Note (most recent)       Final Note - 09/10/22 1435          Final Note    Assessment Type Final Discharge Note     [place]tic[place] [facility] [place] Resources/Appts/Education Provided Appointments scheduled and added to AVS        Post-Acute Status    Post-Acute Authorization Home Health     Home Health Status Set-up Complete/Auth obtained                     Important Message from Medicare             Contact Info       Santino Casas MD   Specialty: Family Medicine   Relationship: PCP - General    [facility]  [address]  [address]  [place] [facility] [address]   Phone: [phone]       Next Steps: Schedule an appointment as soon as possible for a visit in 1 week(s)    Instructions: Hospital follow up

## 2022-09-10 NOTE — DISCHARGE SUMMARY
[facility] Medicine  Discharge Summary      Patient Name: Lilia Tong  MRN: [id number]  Patient Class: OP- Observation  Admission Date: 9/9/2022  Hospital Length of Stay: 0 days  Discharge Date and Time:  09/10/2022 3:14 PM  Attending Physician: Bobo Hernández MD   Discharging Provider: Tomas Hawthorne NP  Primary Care Provider: Santino Casas MD      HPI:   Ms. Tong is a 77-year-old  female with PMH significant for prior CVA, HTN, CKD stage 3, opioid dependent with multiple prior overdoses, was found on the floor at our holes, last seen normal at around 8:30 a.m. this morning.  Patient remembers falling, was noted to have dry blood in the mouth, likely due to biting the tongue.  On EMS arrival, patient was having myoclonic jerking and was not able to speak.  Evaluated in the ED.  Tele neurology recommended CTA and MRI however unable to obtain CTA due to acute kidney injury (creatinine 1.9, baseline 1.3), unable to obtain MRI brain due to presence of spinal stimulator.  CT of the lumbar spine reveals L1 transverse process fracture, stable.  Patient is a very poor historian.  Opens her eyes, answers some questions, but unable to obtain full information.  Moves bilateral upper and lower extremities.       * No surgery found *      Hospital Course:   9/10/22 No acute events overnight. The patient reports improvement in symptoms. The patient is back to her baseline mental status per her son at bedside. Renal function is improved. The patient is unable to get an MRI brain secondary to a spinal stimulator. Repeat CT head today was also negative for acute CVA. The patient is being treated for a UTI. The patient was seen and examined today and deemed stable for discharge. Acute mental status changes were likely secondary to UTI. She will complete a 5 day course of augmentin and follow up with her PCP.        Goals of Care Treatment Preferences:         Consults:   Consults (From admission,  "onward)        Status Ordering Provider     Inpatient consult to Social Work  Once        Provider:  (Not yet assigned)    Completed JOELLEN RUTLEDGE     IP consult to case management  Once        Provider:  (Not yet assigned)    Completed TRACI MINAYA     Consult to Telemedicine - Acute Stroke  Once        Provider:  Jill Senior MD    Acknowledged EDEN JOLLY          No new Assessment & Plan notes have been filed under this hospital service since the last note was generated.  Service: Hospital Medicine    Final Active Diagnoses:    Diagnosis Date Noted POA    HTN (hypertension) [I10] 09/09/2022 Yes    CHANDAN (generalized anxiety disorder) [F41.1] 09/09/2022 Yes    Myoclonus [G25.3] 09/09/2022 Yes    DEMARCO (acute kidney injury) [N17.9] 09/09/2022 Yes    History of stroke [Z86.73] 09/09/2022 Not Applicable      Problems Resolved During this Admission:    Diagnosis Date Noted Date Resolved POA    PRINCIPAL PROBLEM:  Acute metabolic encephalopathy [G93.41] 09/09/2022 09/10/2022 Yes       Discharged Condition: stable    [place] [facility] [place]   Follow-up Information     Santino Casas MD. Schedule an appointment as soon as possible for a visit in 1 week(s).    Specialty: Family Medicine  Why: Hospital follow up  Contact information:  [facility]  [address]  [facility] [id number] [phone]                       Patient Instructions:      LUMBAR SACRAL ORTHOSIS (LSO) FOR HOME USE     Order Specific Question Answer Comments   Height: 4' 9" (1.448 m)    Weight: 53.9 kg (118 lb 13.3 oz)    Length of need (1-99 months): 4        Significant Diagnostic Studies:   Imaging Results          CT Lumbar Spine Without Contrast (Final result)  Result time 09/09/22 18:58:30    Final result by Demar Roberts MD (09/09/22 18:58:30)                 Impression:      Left L1 transverse process fracture.    Remote appearing vertebral body compression fractures.  No acute vertebral body " compression fracture.    Degenerative findings and additional findings as above.    All CT scans at this facility are performed  using dose modulation techniques as appropriate to performed exam including the following:  automated exposure control; adjustment of mA and/or kV according to the patients size (this includes techniques or standardized protocols for targeted exams where dose is matched to indication/reason for exam: i.e. extremities or head);  iterative reconstruction technique.      Electronically signed by: Demar Roberts  Date:    09/09/2022  Time:    18:58             Narrative:    EXAMINATION:  CT LUMBAR SPINE WITHOUT CONTRAST    CLINICAL HISTORY:  Low back pain, trauma;    TECHNIQUE:  Low-dose CT images obtained throughout the region of the lumbar spine.  Axial, sagittal and coronal reformations were performed.  Contrast was not administered.    COMPARISON:  None.    FINDINGS:  Remote appearing L4 and L2 vertebral body compression fractures.  Likely remote T11 vertebral body compression fracture plan vertebral augmentation change at T11 and L4.  No definite acute vertebral body compression fracture identified.  No priors available for direct comparison.    Extensive osseous posterior fusion    Acute minimally displaced fracture of the left L1 transverse process.    Bilateral sacroiliac joint sclerosis.    Normal sagittal alignment is preserved.  No spondylolisthesis.    Multifocal osseous degenerative change without definite severe spinal canal stenosis.  Mild-to-moderate scattered neural foraminal narrowing.    Limited evaluation of the intraspinal contents demonstrates no hematoma or mass.    Paraspinal soft tissues exhibit no acute abnormalities.                               X-Ray Hip 2 or 3 views Right (with Pelvis when performed) (Final result)  Result time 09/09/22 19:00:20    Final result by Demar Roberts MD (09/09/22 19:00:20)                 Impression:      Degenerative changes without  definite acute displaced fracture.      Electronically signed by: Demar Roberts  Date:    09/09/2022  Time:    19:00             Narrative:    EXAMINATION:  XR HIP WITH PELVIS WHEN PERFORMED, 2 OR 3  VIEWS RIGHT    CLINICAL HISTORY:  Pain in right hip    TECHNIQUE:  AP view of the pelvis and frog leg lateral view of the right hip were performed.    COMPARISON:  None    FINDINGS:  No acute displaced right hip fracture identified.    Moderate degenerative change of the hips bilaterally.  No definite acute displaced symphysis pubis fracture or pubic ramus fracture.  Degenerative changes sacroiliac joints and spine.                               X-Ray Chest AP Portable (Final result)  Result time 09/09/22 17:51:48    Final result by Demar Roberts MD (09/09/22 17:51:48)                 Impression:      No acute abnormality.      Electronically signed by: Demar Roberts  Date:    09/09/2022  Time:    17:51             Narrative:    EXAMINATION:  XR CHEST AP PORTABLE    CLINICAL HISTORY:  Stroke;    TECHNIQUE:  Single frontal view of the chest was performed.    COMPARISON:  06/09/2021    FINDINGS:  The lungs are clear, with normal appearance of pulmonary vasculature and no pleural effusion or pneumothorax.    The cardiac silhouette is enlarged.  The hilar and mediastinal contours are unremarkable.    Bones are intact.  Spine stimulator in place.                               CT Maxillofacial Without Contrast (Final result)  Result time 09/09/22 17:16:07    Final result by Demar Roberts MD (09/09/22 17:16:07)                 Impression:      No evidence of an acute displaced fracture.    Additional findings as above.    All CT scans at this facility are performed  using dose modulation techniques as appropriate to performed exam including the following:  automated exposure control; adjustment of mA and/or kV according to the patients size (this includes techniques or standardized protocols for targeted exams where  dose is matched to indication/reason for exam: i.e. extremities or head);  iterative reconstruction technique.      Electronically signed by: Demar Roberts  Date:    09/09/2022  Time:    17:16             Narrative:    EXAMINATION:  CT MAXILLOFACIAL WITHOUT CONTRAST    CLINICAL HISTORY:  Maxillofacial pain;    TECHNIQUE:  Low dose CT images throughout the region of the facial bones.  Axial, sagittal and coronal reformations were obtained.  Contrast was not administered.    COMPARISON:  None    FINDINGS:  No focal soft tissue swelling identified.  The globes and intraorbital contents are within normal limits.  No orbital fracture.    The remainder of the facial bones appear intact without evidence of an acute displaced fracture.  No osseous destructive lesions.    Degenerative change of the temporomandibular joints.    Mastoid air cells are clear.  Patchy opacification of the frontal sinuses, nonspecific.  Correlation advised.    Carotid atherosclerosis.                               CT Head Without Contrast (Final result)  Result time 09/09/22 17:17:25    Final result by Demar Roberts MD (09/09/22 17:17:25)                 Impression:      No definite acute intracranial CT abnormality on this motion degraded exam.    Remote left cerebellar encephalomalacia.    Clinical correlation and further evaluation with MRI as clinically warranted.    All CT scans at this facility are performed  using dose modulation techniques as appropriate to performed exam including the following:  automated exposure control; adjustment of mA and/or kV according to the patients size (this includes techniques or standardized protocols for targeted exams where dose is matched to indication/reason for exam: i.e. extremities or head);  iterative reconstruction technique.      Electronically signed by: Demar Roberts  Date:    09/09/2022  Time:    17:17             Narrative:    EXAMINATION:  CT HEAD WITHOUT CONTRAST    CLINICAL  HISTORY:  Mental status change, unknown cause;    TECHNIQUE:  Low dose axial CT images obtained throughout the head without intravenous contrast. Sagittal and coronal reconstructions were performed.    COMPARISON:  None.    FINDINGS:  Intracranial compartment:    Motion degrades the evaluation.    Ventricles and sulci are normal in size for age without evidence of hydrocephalus. No extra-axial blood or fluid collections.    Mild microvascular ischemic change.  Remote left cerebellar encephalomalacia.  No parenchymal mass, hemorrhage, edema or major vascular distribution infarct.    Skull/extracranial contents (limited evaluation): No fracture. Mastoids are clear.  Partial opacification of the frontal sinuses.                               CT Cervical Spine Without Contrast (Final result)  Result time 09/09/22 17:12:45    Final result by Demar Roberts MD (09/09/22 17:12:45)                 Impression:      No fracture or traumatic malalignment of the cervical spine.    Multifocal degenerative changes as detailed above.  Clinical correlation and further evaluation as warranted.    Motion degrades the evaluation.    All CT scans at this facility are performed  using dose modulation techniques as appropriate to performed exam including the following:  automated exposure control; adjustment of mA and/or kV according to the patients size (this includes techniques or standardized protocols for targeted exams where dose is matched to indication/reason for exam: i.e. extremities or head);  iterative reconstruction technique.      Electronically signed by: Demar Robetrs  Date:    09/09/2022  Time:    17:12             Narrative:    EXAMINATION:  CT CERVICAL SPINE WITHOUT CONTRAST    CLINICAL HISTORY:  Neck pain, acute, infection suspected;    TECHNIQUE:  Low dose axial CT images through the cervical spine, with sagittal and coronal reformations.  Contrast was not administered.    COMPARISON:  No dedicated  priors.    FINDINGS:  The vertebral bodies are normal in height and morphology without evidence of fracture or osseous destructive process.  Normal sagittal alignment is preserved.    Posterior disc osteophyte complexes most notable at C3-4 with moderate spinal canal stenosis and C6-7 with mild to moderate spinal canal stenosis.  Other levels are better preserved.    Facet and uncovertebral joint arthropathy produce scattered mild and moderate neural foraminal narrowing.    Motion degrades the exam.    Limited evaluation of the intraspinal contents demonstrates no hematoma or mass.Paraspinal soft tissues exhibit no acute abnormalities.                                  Pending Diagnostic Studies:     Procedure Component Value Units Date/Time    MRA Brain without contrast [[id number]]     Order Status: Sent Lab Status: No result     MRA Neck without contrast [[id number]]     Order Status: Sent Lab Status: No result     MRI Brain Without Contrast [[id number]]     Order Status: Sent Lab Status: No result          Medications:  Reconciled Home Medications:      Medication List      START taking these medications    amoxicillin-clavulanate 500-125mg 500-125 mg Tab  Commonly known as: AUGMENTIN  Take 1 tablet (500 mg total) by mouth 2 (two) times daily. for 5 days        CHANGE how you take these medications    olmesartan 40 MG tablet  Commonly known as: BENICAR  Take 40 mg by mouth once daily.  What changed: Another medication with the same name was removed. Continue taking this medication, and follow the directions you see here.        CONTINUE taking these medications    amLODIPine 10 MG tablet  Commonly known as: NORVASC  Take 5 mg by mouth once daily.     busPIRone 10 MG tablet  Commonly known as: BUSPAR  Take 10 mg by mouth 2 (two) times daily.     clopidogreL 75 mg tablet  Commonly known as: PLAVIX  Take 75 mg by mouth once daily.     DULoxetine 20 MG capsule  Commonly known as: CYMBALTA  Take 20 mg by mouth once  daily.     furosemide 20 MG tablet  Commonly known as: LASIX  Take 20 mg by mouth once daily.     gabapentin 300 MG capsule  Commonly known as: NEURONTIN  Take 300 mg by mouth 3 (three) times daily.     hydrOXYzine HCL 10 MG Tab  Commonly known as: ATARAX  Take 10 mg by mouth 2 (two) times daily as needed.     ibuprofen 600 MG tablet  Commonly known as: ADVIL,MOTRIN  Take 600 mg by mouth 3 (three) times daily.     isosorbide mononitrate 30 MG 24 hr tablet  Commonly known as: IMDUR     lactulose 10 gram/15 mL solution  Commonly known as: CHRONULAC     metoprolol succinate 25 MG 24 hr tablet  Commonly known as: TOPROL-XL  Take 25 mg by mouth 2 (two) times daily.     morphine 15 MG tablet  Commonly known as: MSIR  morphine 15 mg immediate release tablet   Take 1 tablet 3 times a day by oral route as needed for 30 days.     MOVANTIK 25 mg tablet  Generic drug: naloxegoL  Take 25 mg by mouth every morning.     nitroGLYCERIN 0.4 MG SL tablet  Commonly known as: NITROSTAT  Place 0.4 mg under the tongue every 5 (five) minutes as needed for Chest pain.     omeprazole 40 MG capsule  Commonly known as: PRILOSEC  Take 40 mg by mouth once daily.     ondansetron 8 MG tablet  Commonly known as: ZOFRAN     oxyCODONE 15 MG Tab  Commonly known as: ROXICODONE  Take 15 mg by mouth 2 (two) times daily.     rosuvastatin 40 MG Tab  Commonly known as: CRESTOR     spironolactone 50 MG tablet  Commonly known as: ALDACTONE  Take 50 mg by mouth once daily.     tiZANidine 4 MG tablet  Commonly known as: ZANAFLEX  Take 4 mg by mouth 2 (two) times daily as needed.     topiramate 25 MG tablet  Commonly known as: TOPAMAX  Take 25 mg by mouth once daily.     traZODone 50 MG tablet  Commonly known as: DESYREL  Take 50 mg by mouth nightly as needed.            Indwelling Lines/Drains at time of discharge:   Lines/Drains/Airways     None                 Time spent on the discharge of patient: > 35 minutes         Tomas Hawthorne NP  Department of  [facility] Medicine  '[facility] Surg

## 2022-09-10 NOTE — PLAN OF CARE
[facility] - Med Surg  Discharge Assessment    Primary Care Provider: Santino Casas MD     Discharge Assessment (most recent)       BRIEF DISCHARGE ASSESSMENT - 09/10/22 1343          Discharge Planning    Assessment Type Discharge Planning Brief Assessment     Resource/Environmental Concerns none     Support Systems Children     Current Living Arrangements home/apartment/condo     Patient/Family Anticipates Transition to home with family;home with help/services     Patient/Family Anticipated Services at Transition none     DME Needed Upon Discharge  none     Discharge Plan A Home with family;Home Health     Discharge Plan B Home with family;Home Health        Physical Activity    On average, how many days per week do you engage in moderate to strenuous exercise (like a brisk walk)? 0 days     On average, how many minutes do you engage in exercise at this level? 0 min        Financial Resource Strain    How hard is it for you to pay for the very basics like food, housing, medical care, and heating? Not hard at all        Housing Stability    In the last 12 months, was there a time when you were not able to pay the mortgage or rent on time? Yes     In the last 12 months, was there a time when you did not have a steady place to sleep or slept in a shelter (including now)? No        Transportation Needs    In the past 12 months, has lack of transportation kept you from medical appointments or from getting medications? No     In the past 12 months, has lack of transportation kept you from meetings, work, or from getting things needed for daily living? No        Food Insecurity    Within the past 12 months, you worried that your food would run out before you got the money to buy more. Never true     Within the past 12 months, the food you bought just didn't last and you didn't have money to get more. Never true        Social Connections    In a typical week, how many times do you talk on the phone with family, friends, or  neighbors? Never     How often do you get together with friends or relatives? More than three times a week     Do you belong to any clubs or organizations such as Muslim groups, unions, fraternal or athletic groups, or school groups? No     How often do you attend meetings of the clubs or organizations you belong to? Never        Alcohol Use    Q1: How often do you have a drink containing alcohol? Never     Q2: How many drinks containing alcohol do you have on a typical day when you are drinking? Patient does not drink     Q3: How often do you have six or more drinks on one occasion? Never

## 2022-09-10 NOTE — PLAN OF CARE
Pt is stable and on room air. Pt was consulted about a getting a colonoscopy. Pt refused treatment option. Pain has been managed by ordered meds. Pt is oriented and call light is within reach.

## 2022-09-10 NOTE — HPI
Ms. Tong is a 77-year-old  female with PMH significant for prior CVA, HTN, CKD stage 3, opioid dependent with multiple prior overdoses, was found on the floor at our [facility], last seen normal at around 8:30 a.m. this morning.  Patient remembers falling, was noted to have dry blood in the mouth, likely due to biting the tongue.  On EMS arrival, patient was having myoclonic jerking and was not able to speak.  Evaluated in the ED.  Tele neurology recommended CTA and MRI however unable to obtain CTA due to acute kidney injury (creatinine 1.9, baseline 1.3), unable to obtain MRI brain due to presence of spinal stimulator.  CT of the lumbar spine reveals L1 transverse process fracture, stable.  Patient is a very poor historian.  Opens her eyes, answers some questions, but unable to obtain full information.  Moves bilateral upper and lower extremities.

## 2022-09-10 NOTE — SUBJECTIVE & OBJECTIVE
Past Medical History:   Diagnosis Date    Hypertension     Stroke        History reviewed. No pertinent surgical history.    Review of patient's allergies indicates:   Allergen Reactions    Stadol [butorphanol tartrate] Hives and Hallucinations    Sulfa (sulfonamide antibiotics) Rash       No current facility-administered medications on file prior to encounter.     Current Outpatient Medications on File Prior to Encounter   Medication Sig    amLODIPine (NORVASC) 10 MG tablet Take 5 mg by mouth once daily.    busPIRone (BUSPAR) 10 MG tablet Take 10 mg by mouth 2 (two) times daily.    clopidogreL (PLAVIX) 75 mg tablet Take 75 mg by mouth once daily.    DULoxetine (CYMBALTA) 20 MG capsule Take 20 mg by mouth once daily.    furosemide (LASIX) 20 MG tablet Take 20 mg by mouth once daily.    gabapentin (NEURONTIN) 300 MG capsule Take 300 mg by mouth 3 (three) times daily.    hydrOXYzine HCL (ATARAX) 10 MG Tab Take 10 mg by mouth 2 (two) times daily as needed.    ibuprofen (ADVIL,MOTRIN) 600 MG tablet Take 600 mg by mouth 3 (three) times daily.    isosorbide mononitrate (IMDUR) 30 MG 24 hr tablet     lactulose (CHRONULAC) 10 gram/15 mL solution     metoprolol succinate (TOPROL-XL) 25 MG 24 hr tablet Take 25 mg by mouth 2 (two) times daily.    morphine (MSIR) 15 MG tablet morphine 15 mg immediate release tablet   Take 1 tablet 3 times a day by oral route as needed for 30 days.    MOVANTIK 25 mg tablet Take 25 mg by mouth every morning.    omeprazole (PRILOSEC) 40 MG capsule Take 40 mg by mouth once daily.    ondansetron (ZOFRAN) 8 MG tablet     oxyCODONE (ROXICODONE) 15 MG Tab Take 15 mg by mouth 2 (two) times daily.    rosuvastatin (CRESTOR) 40 MG Tab     spironolactone (ALDACTONE) 50 MG tablet Take 50 mg by mouth once daily.    tiZANidine (ZANAFLEX) 4 MG tablet Take 4 mg by mouth 2 (two) times daily as needed.    topiramate (TOPAMAX) 25 MG tablet Take 25 mg by mouth once daily.    traZODone (DESYREL) 50 MG tablet Take 50  mg by mouth nightly as needed.     Family History       Problem Relation (Age of Onset)    Hypertension Father          Tobacco Use    Smoking status: Never    Smokeless tobacco: Never   Substance and Sexual Activity    Alcohol use: Not on file    Drug use: Not on file    Sexual activity: Not on file     Review of Systems   Unable to perform ROS: Mental status change   Objective:     Vital Signs (Most Recent):  Temp: 98.9 °F (37.2 °C) (09/09/22 1636)  Pulse: (!) 53 (09/09/22 1936)  Resp: 19 (09/09/22 1747)  BP: (!) 188/74 (09/09/22 1936)  SpO2: 98 % (09/09/22 1936)   Vital Signs (24h Range):  Temp:  [98.9 °F (37.2 °C)] 98.9 °F (37.2 °C)  Pulse:  [53-64] 53  Resp:  [18-19] 19  SpO2:  [95 %-98 %] 98 %  BP: (163-195)/() 188/74     Weight: 53.9 kg (118 lb 13.3 oz)  Body mass index is 25.71 kg/m².    Physical Exam  Vitals and nursing note reviewed.   Constitutional:       Appearance: She is ill-appearing.   HENT:      Head: Normocephalic and atraumatic.      Mouth/Throat:      Mouth: Mucous membranes are moist.      Comments: Dried blood noted in mouth  Eyes:      General: No scleral icterus.     Conjunctiva/sclera: Conjunctivae normal.   Cardiovascular:      Rate and Rhythm: Normal rate and regular rhythm.      Heart sounds: No murmur heard.  Pulmonary:      Effort: Pulmonary effort is normal. No respiratory distress.      Breath sounds: No wheezing.   Abdominal:      Palpations: Abdomen is soft.      Tenderness: There is no abdominal tenderness.   Musculoskeletal:         General: No swelling.      Cervical back: Neck supple.   Skin:     General: Skin is warm.      Coloration: Skin is not jaundiced.      Findings: Bruising present.   Neurological:      Mental Status: She is oriented to person, place, and time and easily aroused.      Comments: Opens eyes to verbal commands, but is disoriented.  Able to move bilateral upper and lower extremities to verbal commands.   Psychiatric:         Attention and Perception:  She is inattentive.         Mood and Affect: Affect is flat.         Speech: Speech is delayed.           Significant Labs: All pertinent labs within the past 24 hours have been reviewed.  Bilirubin:   Recent Labs   Lab 09/09/22  1651   BILITOT 0.6     Blood Culture: No results for input(s): LABBLOO in the last 48 hours.  BMP:   Recent Labs   Lab 09/09/22  1651   *      K 4.3      CO2 21*   BUN 22   CREATININE 1.9*   CALCIUM 8.9     CBC:   Recent Labs   Lab 09/09/22  1651   WBC 8.20   HGB 10.5*   HCT 32.1*        CMP:   Recent Labs   Lab 09/09/22  1651      K 4.3      CO2 21*   *   BUN 22   CREATININE 1.9*   CALCIUM 8.9   PROT 7.2   ALBUMIN 3.4*   BILITOT 0.6   ALKPHOS 168*   AST 28   ALT 22   ANIONGAP 9     Cardiac Markers:   Recent Labs   Lab 09/09/22  1651   *     Troponin:   Recent Labs   Lab 09/09/22  1651   TROPONINI 0.028*     Urine Studies:   Recent Labs   Lab 09/09/22  1805   COLORU Yellow   APPEARANCEUA Hazy*   PHUR 6.0   SPECGRAV 1.010   PROTEINUA 1+*   GLUCUA Negative   KETONESU Negative   BILIRUBINUA Negative   OCCULTUA Negative   NITRITE Negative   UROBILINOGEN Negative   LEUKOCYTESUR Trace*   RBCUA 0   WBCUA 3   BACTERIA Moderate*   SQUAMEPITHEL 2   HYALINECASTS 0       Significant Imaging: I have reviewed all pertinent imaging results/findings within the past 24 hours.  I have reviewed and interpreted all pertinent imaging results/findings within the past 24 hours.    Imaging Results              CT Lumbar Spine Without Contrast (Final result)  Result time 09/09/22 18:58:30      Final result by Demar Roberts MD (09/09/22 18:58:30)                   Impression:      Left L1 transverse process fracture.    Remote appearing vertebral body compression fractures.  No acute vertebral body compression fracture.    Degenerative findings and additional findings as above.    All CT scans at this facility are performed  using dose modulation techniques as  appropriate to performed exam including the following:  automated exposure control; adjustment of mA and/or kV according to the patients size (this includes techniques or standardized protocols for targeted exams where dose is matched to indication/reason for exam: i.e. extremities or head);  iterative reconstruction technique.      Electronically signed by: Demar Roberts  Date:    09/09/2022  Time:    18:58               Narrative:    EXAMINATION:  CT LUMBAR SPINE WITHOUT CONTRAST    CLINICAL HISTORY:  Low back pain, trauma;    TECHNIQUE:  Low-dose CT images obtained throughout the region of the lumbar spine.  Axial, sagittal and coronal reformations were performed.  Contrast was not administered.    COMPARISON:  None.    FINDINGS:  Remote appearing L4 and L2 vertebral body compression fractures.  Likely remote T11 vertebral body compression fracture plan vertebral augmentation change at T11 and L4.  No definite acute vertebral body compression fracture identified.  No priors available for direct comparison.    Extensive osseous posterior fusion    Acute minimally displaced fracture of the left L1 transverse process.    Bilateral sacroiliac joint sclerosis.    Normal sagittal alignment is preserved.  No spondylolisthesis.    Multifocal osseous degenerative change without definite severe spinal canal stenosis.  Mild-to-moderate scattered neural foraminal narrowing.    Limited evaluation of the intraspinal contents demonstrates no hematoma or mass.    Paraspinal soft tissues exhibit no acute abnormalities.                                       X-Ray Hip 2 or 3 views Right (with Pelvis when performed) (Final result)  Result time 09/09/22 19:00:20      Final result by Demar Roberts MD (09/09/22 19:00:20)                   Impression:      Degenerative changes without definite acute displaced fracture.      Electronically signed by: Demar Roberts  Date:    09/09/2022  Time:    19:00               Narrative:     EXAMINATION:  XR HIP WITH PELVIS WHEN PERFORMED, 2 OR 3  VIEWS RIGHT    CLINICAL HISTORY:  Pain in right hip    TECHNIQUE:  AP view of the pelvis and frog leg lateral view of the right hip were performed.    COMPARISON:  None    FINDINGS:  No acute displaced right hip fracture identified.    Moderate degenerative change of the hips bilaterally.  No definite acute displaced symphysis pubis fracture or pubic ramus fracture.  Degenerative changes sacroiliac joints and spine.                                       X-Ray Chest AP Portable (Final result)  Result time 09/09/22 17:51:48      Final result by Demar Roberts MD (09/09/22 17:51:48)                   Impression:      No acute abnormality.      Electronically signed by: Demar Roberts  Date:    09/09/2022  Time:    17:51               Narrative:    EXAMINATION:  XR CHEST AP PORTABLE    CLINICAL HISTORY:  Stroke;    TECHNIQUE:  Single frontal view of the chest was performed.    COMPARISON:  06/09/2021    FINDINGS:  The lungs are clear, with normal appearance of pulmonary vasculature and no pleural effusion or pneumothorax.    The cardiac silhouette is enlarged.  The hilar and mediastinal contours are unremarkable.    Bones are intact.  Spine stimulator in place.                                       CT Maxillofacial Without Contrast (Final result)  Result time 09/09/22 17:16:07      Final result by Demar Roberts MD (09/09/22 17:16:07)                   Impression:      No evidence of an acute displaced fracture.    Additional findings as above.    All CT scans at this facility are performed  using dose modulation techniques as appropriate to performed exam including the following:  automated exposure control; adjustment of mA and/or kV according to the patients size (this includes techniques or standardized protocols for targeted exams where dose is matched to indication/reason for exam: i.e. extremities or head);  iterative reconstruction  technique.      Electronically signed by: Demar Roberts  Date:    09/09/2022  Time:    17:16               Narrative:    EXAMINATION:  CT MAXILLOFACIAL WITHOUT CONTRAST    CLINICAL HISTORY:  Maxillofacial pain;    TECHNIQUE:  Low dose CT images throughout the region of the facial bones.  Axial, sagittal and coronal reformations were obtained.  Contrast was not administered.    COMPARISON:  None    FINDINGS:  No focal soft tissue swelling identified.  The globes and intraorbital contents are within normal limits.  No orbital fracture.    The remainder of the facial bones appear intact without evidence of an acute displaced fracture.  No osseous destructive lesions.    Degenerative change of the temporomandibular joints.    Mastoid air cells are clear.  Patchy opacification of the frontal sinuses, nonspecific.  Correlation advised.    Carotid atherosclerosis.                                       CT Head Without Contrast (Final result)  Result time 09/09/22 17:17:25      Final result by Demar Roberts MD (09/09/22 17:17:25)                   Impression:      No definite acute intracranial CT abnormality on this motion degraded exam.    Remote left cerebellar encephalomalacia.    Clinical correlation and further evaluation with MRI as clinically warranted.    All CT scans at this facility are performed  using dose modulation techniques as appropriate to performed exam including the following:  automated exposure control; adjustment of mA and/or kV according to the patients size (this includes techniques or standardized protocols for targeted exams where dose is matched to indication/reason for exam: i.e. extremities or head);  iterative reconstruction technique.      Electronically signed by: Demar Roberts  Date:    09/09/2022  Time:    17:17               Narrative:    EXAMINATION:  CT HEAD WITHOUT CONTRAST    CLINICAL HISTORY:  Mental status change, unknown cause;    TECHNIQUE:  Low dose axial CT images obtained  throughout the head without intravenous contrast. Sagittal and coronal reconstructions were performed.    COMPARISON:  None.    FINDINGS:  Intracranial compartment:    Motion degrades the evaluation.    Ventricles and sulci are normal in size for age without evidence of hydrocephalus. No extra-axial blood or fluid collections.    Mild microvascular ischemic change.  Remote left cerebellar encephalomalacia.  No parenchymal mass, hemorrhage, edema or major vascular distribution infarct.    Skull/extracranial contents (limited evaluation): No fracture. Mastoids are clear.  Partial opacification of the frontal sinuses.                                       CT Cervical Spine Without Contrast (Final result)  Result time 09/09/22 17:12:45      Final result by Demar Roberts MD (09/09/22 17:12:45)                   Impression:      No fracture or traumatic malalignment of the cervical spine.    Multifocal degenerative changes as detailed above.  Clinical correlation and further evaluation as warranted.    Motion degrades the evaluation.    All CT scans at this facility are performed  using dose modulation techniques as appropriate to performed exam including the following:  automated exposure control; adjustment of mA and/or kV according to the patients size (this includes techniques or standardized protocols for targeted exams where dose is matched to indication/reason for exam: i.e. extremities or head);  iterative reconstruction technique.      Electronically signed by: Demar Roberts  Date:    09/09/2022  Time:    17:12               Narrative:    EXAMINATION:  CT CERVICAL SPINE WITHOUT CONTRAST    CLINICAL HISTORY:  Neck pain, acute, infection suspected;    TECHNIQUE:  Low dose axial CT images through the cervical spine, with sagittal and coronal reformations.  Contrast was not administered.    COMPARISON:  No dedicated priors.    FINDINGS:  The vertebral bodies are normal in height and morphology without evidence of  fracture or osseous destructive process.  Normal sagittal alignment is preserved.    Posterior disc osteophyte complexes most notable at C3-4 with moderate spinal canal stenosis and C6-7 with mild to moderate spinal canal stenosis.  Other levels are better preserved.    Facet and uncovertebral joint arthropathy produce scattered mild and moderate neural foraminal narrowing.    Motion degrades the exam.    Limited evaluation of the intraspinal contents demonstrates no hematoma or mass.Paraspinal soft tissues exhibit no acute abnormalities.                                    I have independently reviewed and interpreted the EKG.     I have independently reviewed all pertinent labs within the past 24 hours.    I have independently reviewed, visualized and interpreted all pertinent imaging results within the past 24 hours and discussed the findings with the ED physician, Andres Solomon MD

## 2022-12-12 PROBLEM — N17.9 AKI (ACUTE KIDNEY INJURY): Status: RESOLVED | Noted: 2022-09-09 | Resolved: 2022-12-12
